# Patient Record
Sex: FEMALE | Race: WHITE | NOT HISPANIC OR LATINO
[De-identification: names, ages, dates, MRNs, and addresses within clinical notes are randomized per-mention and may not be internally consistent; named-entity substitution may affect disease eponyms.]

---

## 2017-10-30 ENCOUNTER — RESULT REVIEW (OUTPATIENT)
Age: 27
End: 2017-10-30

## 2018-06-04 ENCOUNTER — APPOINTMENT (OUTPATIENT)
Dept: HUMAN REPRODUCTION | Facility: CLINIC | Age: 28
End: 2018-06-04
Payer: COMMERCIAL

## 2018-06-04 PROCEDURE — 99205 OFFICE O/P NEW HI 60 MIN: CPT | Mod: 25

## 2018-06-04 PROCEDURE — 76830 TRANSVAGINAL US NON-OB: CPT

## 2018-06-04 PROCEDURE — 36415 COLL VENOUS BLD VENIPUNCTURE: CPT

## 2018-06-05 ENCOUNTER — TRANSCRIPTION ENCOUNTER (OUTPATIENT)
Age: 28
End: 2018-06-05

## 2018-06-13 ENCOUNTER — APPOINTMENT (OUTPATIENT)
Dept: HUMAN REPRODUCTION | Facility: CLINIC | Age: 28
End: 2018-06-13
Payer: COMMERCIAL

## 2018-06-13 PROCEDURE — 36415 COLL VENOUS BLD VENIPUNCTURE: CPT

## 2018-09-18 ENCOUNTER — RESULT REVIEW (OUTPATIENT)
Age: 28
End: 2018-09-18

## 2018-12-03 ENCOUNTER — ASOB RESULT (OUTPATIENT)
Age: 28
End: 2018-12-03

## 2018-12-03 ENCOUNTER — APPOINTMENT (OUTPATIENT)
Dept: ANTEPARTUM | Facility: CLINIC | Age: 28
End: 2018-12-03
Payer: COMMERCIAL

## 2018-12-03 PROCEDURE — 76817 TRANSVAGINAL US OBSTETRIC: CPT | Mod: 59

## 2018-12-03 PROCEDURE — 76811 OB US DETAILED SNGL FETUS: CPT

## 2019-04-28 ENCOUNTER — INPATIENT (INPATIENT)
Facility: HOSPITAL | Age: 29
LOS: 2 days | Discharge: HOME | End: 2019-05-01
Attending: OBSTETRICS & GYNECOLOGY | Admitting: OBSTETRICS & GYNECOLOGY
Payer: COMMERCIAL

## 2019-04-28 DIAGNOSIS — Z34.80 ENCOUNTER FOR SUPERVISION OF OTHER NORMAL PREGNANCY, UNSPECIFIED TRIMESTER: ICD-10-CM

## 2019-04-28 DIAGNOSIS — O26.899 OTHER SPECIFIED PREGNANCY RELATED CONDITIONS, UNSPECIFIED TRIMESTER: ICD-10-CM

## 2019-04-28 DIAGNOSIS — Z3A.00 WEEKS OF GESTATION OF PREGNANCY NOT SPECIFIED: ICD-10-CM

## 2019-04-28 LAB
BASOPHILS # BLD AUTO: 0.1 K/UL — SIGNIFICANT CHANGE UP (ref 0–0.2)
BASOPHILS NFR BLD AUTO: 0.6 % — SIGNIFICANT CHANGE UP (ref 0–2)
BLD GP AB SCN SERPL QL: NEGATIVE — SIGNIFICANT CHANGE UP
EOSINOPHIL # BLD AUTO: 0.2 K/UL — SIGNIFICANT CHANGE UP (ref 0–0.5)
EOSINOPHIL NFR BLD AUTO: 1.7 % — SIGNIFICANT CHANGE UP (ref 0–6)
HCT VFR BLD CALC: 35.7 % — SIGNIFICANT CHANGE UP (ref 34.5–45)
HGB BLD-MCNC: 12.6 G/DL — SIGNIFICANT CHANGE UP (ref 11.5–15.5)
LYMPHOCYTES # BLD AUTO: 1.9 K/UL — SIGNIFICANT CHANGE UP (ref 1–3.3)
LYMPHOCYTES # BLD AUTO: 18 % — SIGNIFICANT CHANGE UP (ref 13–44)
MCHC RBC-ENTMCNC: 33.3 PG — SIGNIFICANT CHANGE UP (ref 27–34)
MCHC RBC-ENTMCNC: 35.3 GM/DL — SIGNIFICANT CHANGE UP (ref 32–36)
MCV RBC AUTO: 94.5 FL — SIGNIFICANT CHANGE UP (ref 80–100)
MONOCYTES # BLD AUTO: 0.7 K/UL — SIGNIFICANT CHANGE UP (ref 0–0.9)
MONOCYTES NFR BLD AUTO: 6.5 % — SIGNIFICANT CHANGE UP (ref 2–14)
NEUTROPHILS # BLD AUTO: 7.9 K/UL — HIGH (ref 1.8–7.4)
NEUTROPHILS NFR BLD AUTO: 73.3 % — SIGNIFICANT CHANGE UP (ref 43–77)
PLATELET # BLD AUTO: 190 K/UL — SIGNIFICANT CHANGE UP (ref 150–400)
RBC # BLD: 3.78 M/UL — LOW (ref 3.8–5.2)
RBC # FLD: 12.1 % — SIGNIFICANT CHANGE UP (ref 10.3–14.5)
RH IG SCN BLD-IMP: POSITIVE — SIGNIFICANT CHANGE UP
RH IG SCN BLD-IMP: POSITIVE — SIGNIFICANT CHANGE UP
WBC # BLD: 10.8 K/UL — HIGH (ref 3.8–10.5)
WBC # FLD AUTO: 10.8 K/UL — HIGH (ref 3.8–10.5)

## 2019-04-28 RX ORDER — OXYTOCIN 10 UNIT/ML
4 VIAL (ML) INJECTION
Qty: 30 | Refills: 0 | Status: DISCONTINUED | OUTPATIENT
Start: 2019-04-28 | End: 2019-05-01

## 2019-04-28 RX ORDER — OXYTOCIN 10 UNIT/ML
333.33 VIAL (ML) INJECTION
Qty: 20 | Refills: 0 | Status: DISCONTINUED | OUTPATIENT
Start: 2019-04-28 | End: 2019-04-29

## 2019-04-28 RX ORDER — CITRIC ACID/SODIUM CITRATE 300-500 MG
15 SOLUTION, ORAL ORAL EVERY 4 HOURS
Qty: 0 | Refills: 0 | Status: DISCONTINUED | OUTPATIENT
Start: 2019-04-28 | End: 2019-04-29

## 2019-04-28 RX ORDER — SODIUM CHLORIDE 9 MG/ML
1000 INJECTION, SOLUTION INTRAVENOUS ONCE
Qty: 0 | Refills: 0 | Status: COMPLETED | OUTPATIENT
Start: 2019-04-28 | End: 2019-04-28

## 2019-04-28 RX ORDER — SODIUM CHLORIDE 9 MG/ML
1000 INJECTION, SOLUTION INTRAVENOUS
Qty: 0 | Refills: 0 | Status: DISCONTINUED | OUTPATIENT
Start: 2019-04-28 | End: 2019-04-29

## 2019-04-28 RX ADMIN — SODIUM CHLORIDE 2000 MILLILITER(S): 9 INJECTION, SOLUTION INTRAVENOUS at 19:45

## 2019-04-28 RX ADMIN — SODIUM CHLORIDE 250 MILLILITER(S): 9 INJECTION, SOLUTION INTRAVENOUS at 19:59

## 2019-04-29 VITALS
DIASTOLIC BLOOD PRESSURE: 55 MMHG | OXYGEN SATURATION: 98 % | SYSTOLIC BLOOD PRESSURE: 97 MMHG | RESPIRATION RATE: 18 BRPM | TEMPERATURE: 100 F | HEART RATE: 74 BPM

## 2019-04-29 LAB — T PALLIDUM AB TITR SER: NEGATIVE — SIGNIFICANT CHANGE UP

## 2019-04-29 RX ORDER — MAGNESIUM HYDROXIDE 400 MG/1
30 TABLET, CHEWABLE ORAL
Qty: 0 | Refills: 0 | Status: DISCONTINUED | OUTPATIENT
Start: 2019-04-30 | End: 2019-05-01

## 2019-04-29 RX ORDER — SODIUM CHLORIDE 9 MG/ML
3 INJECTION INTRAMUSCULAR; INTRAVENOUS; SUBCUTANEOUS EVERY 8 HOURS
Qty: 0 | Refills: 0 | Status: DISCONTINUED | OUTPATIENT
Start: 2019-04-29 | End: 2019-04-29

## 2019-04-29 RX ORDER — SODIUM CHLORIDE 9 MG/ML
500 INJECTION, SOLUTION INTRAVENOUS ONCE
Qty: 0 | Refills: 0 | Status: DISCONTINUED | OUTPATIENT
Start: 2019-04-29 | End: 2019-05-01

## 2019-04-29 RX ORDER — OXYCODONE HYDROCHLORIDE 5 MG/1
5 TABLET ORAL
Qty: 0 | Refills: 0 | Status: DISCONTINUED | OUTPATIENT
Start: 2019-04-30 | End: 2019-05-01

## 2019-04-29 RX ORDER — IBUPROFEN 200 MG
600 TABLET ORAL EVERY 6 HOURS
Qty: 0 | Refills: 0 | Status: COMPLETED | OUTPATIENT
Start: 2019-04-30 | End: 2020-03-28

## 2019-04-29 RX ORDER — GENTAMICIN SULFATE 40 MG/ML
375 VIAL (ML) INJECTION ONCE
Qty: 0 | Refills: 0 | Status: COMPLETED | OUTPATIENT
Start: 2019-04-29 | End: 2019-04-29

## 2019-04-29 RX ORDER — ACETAMINOPHEN 500 MG
975 TABLET ORAL EVERY 6 HOURS
Qty: 0 | Refills: 0 | Status: COMPLETED | OUTPATIENT
Start: 2019-04-30 | End: 2020-03-28

## 2019-04-29 RX ORDER — LANOLIN
1 OINTMENT (GRAM) TOPICAL EVERY 6 HOURS
Qty: 0 | Refills: 0 | Status: DISCONTINUED | OUTPATIENT
Start: 2019-04-30 | End: 2019-05-01

## 2019-04-29 RX ORDER — AER TRAVELER 0.5 G/1
1 SOLUTION RECTAL; TOPICAL EVERY 4 HOURS
Qty: 0 | Refills: 0 | Status: DISCONTINUED | OUTPATIENT
Start: 2019-04-29 | End: 2019-04-29

## 2019-04-29 RX ORDER — SODIUM CHLORIDE 9 MG/ML
3 INJECTION INTRAMUSCULAR; INTRAVENOUS; SUBCUTANEOUS EVERY 8 HOURS
Qty: 0 | Refills: 0 | Status: DISCONTINUED | OUTPATIENT
Start: 2019-04-30 | End: 2019-05-01

## 2019-04-29 RX ORDER — DIBUCAINE 1 %
1 OINTMENT (GRAM) RECTAL EVERY 4 HOURS
Qty: 0 | Refills: 0 | Status: DISCONTINUED | OUTPATIENT
Start: 2019-04-29 | End: 2019-04-29

## 2019-04-29 RX ORDER — HYDROCORTISONE 1 %
1 OINTMENT (GRAM) TOPICAL EVERY 4 HOURS
Qty: 0 | Refills: 0 | Status: DISCONTINUED | OUTPATIENT
Start: 2019-04-29 | End: 2019-04-29

## 2019-04-29 RX ORDER — KETOROLAC TROMETHAMINE 30 MG/ML
30 SYRINGE (ML) INJECTION ONCE
Qty: 0 | Refills: 0 | Status: DISCONTINUED | OUTPATIENT
Start: 2019-04-29 | End: 2019-04-29

## 2019-04-29 RX ORDER — PRAMOXINE HYDROCHLORIDE 150 MG/15G
1 AEROSOL, FOAM RECTAL EVERY 4 HOURS
Qty: 0 | Refills: 0 | Status: DISCONTINUED | OUTPATIENT
Start: 2019-04-30 | End: 2019-05-01

## 2019-04-29 RX ORDER — AMPICILLIN TRIHYDRATE 250 MG
2 CAPSULE ORAL EVERY 6 HOURS
Qty: 0 | Refills: 0 | Status: DISCONTINUED | OUTPATIENT
Start: 2019-04-30 | End: 2019-04-30

## 2019-04-29 RX ORDER — HYDROCORTISONE 1 %
1 OINTMENT (GRAM) TOPICAL EVERY 4 HOURS
Qty: 0 | Refills: 0 | Status: DISCONTINUED | OUTPATIENT
Start: 2019-04-30 | End: 2019-05-01

## 2019-04-29 RX ORDER — ACETAMINOPHEN 500 MG
975 TABLET ORAL ONCE
Qty: 0 | Refills: 0 | Status: COMPLETED | OUTPATIENT
Start: 2019-04-29 | End: 2019-04-29

## 2019-04-29 RX ORDER — DOCUSATE SODIUM 100 MG
100 CAPSULE ORAL
Qty: 0 | Refills: 0 | Status: DISCONTINUED | OUTPATIENT
Start: 2019-04-30 | End: 2019-05-01

## 2019-04-29 RX ORDER — GLYCERIN ADULT
1 SUPPOSITORY, RECTAL RECTAL AT BEDTIME
Qty: 0 | Refills: 0 | Status: DISCONTINUED | OUTPATIENT
Start: 2019-04-30 | End: 2019-05-01

## 2019-04-29 RX ORDER — OXYCODONE HYDROCHLORIDE 5 MG/1
5 TABLET ORAL EVERY 4 HOURS
Qty: 0 | Refills: 0 | Status: DISCONTINUED | OUTPATIENT
Start: 2019-04-30 | End: 2019-05-01

## 2019-04-29 RX ORDER — SIMETHICONE 80 MG/1
80 TABLET, CHEWABLE ORAL EVERY 6 HOURS
Qty: 0 | Refills: 0 | Status: DISCONTINUED | OUTPATIENT
Start: 2019-04-30 | End: 2019-05-01

## 2019-04-29 RX ORDER — AER TRAVELER 0.5 G/1
1 SOLUTION RECTAL; TOPICAL EVERY 4 HOURS
Qty: 0 | Refills: 0 | Status: DISCONTINUED | OUTPATIENT
Start: 2019-04-30 | End: 2019-05-01

## 2019-04-29 RX ORDER — AMPICILLIN TRIHYDRATE 250 MG
2 CAPSULE ORAL ONCE
Qty: 0 | Refills: 0 | Status: COMPLETED | OUTPATIENT
Start: 2019-04-29 | End: 2019-04-29

## 2019-04-29 RX ORDER — AMPICILLIN TRIHYDRATE 250 MG
CAPSULE ORAL
Qty: 0 | Refills: 0 | Status: DISCONTINUED | OUTPATIENT
Start: 2019-04-29 | End: 2019-04-30

## 2019-04-29 RX ORDER — PRAMOXINE HYDROCHLORIDE 150 MG/15G
1 AEROSOL, FOAM RECTAL EVERY 4 HOURS
Qty: 0 | Refills: 0 | Status: DISCONTINUED | OUTPATIENT
Start: 2019-04-29 | End: 2019-04-29

## 2019-04-29 RX ORDER — DIPHENHYDRAMINE HCL 50 MG
25 CAPSULE ORAL EVERY 6 HOURS
Qty: 0 | Refills: 0 | Status: DISCONTINUED | OUTPATIENT
Start: 2019-04-30 | End: 2019-05-01

## 2019-04-29 RX ORDER — TETANUS TOXOID, REDUCED DIPHTHERIA TOXOID AND ACELLULAR PERTUSSIS VACCINE, ADSORBED 5; 2.5; 8; 8; 2.5 [IU]/.5ML; [IU]/.5ML; UG/.5ML; UG/.5ML; UG/.5ML
0.5 SUSPENSION INTRAMUSCULAR ONCE
Qty: 0 | Refills: 0 | Status: DISCONTINUED | OUTPATIENT
Start: 2019-04-30 | End: 2019-05-01

## 2019-04-29 RX ORDER — OXYTOCIN 10 UNIT/ML
41.67 VIAL (ML) INJECTION
Qty: 20 | Refills: 0 | Status: DISCONTINUED | OUTPATIENT
Start: 2019-04-29 | End: 2019-04-29

## 2019-04-29 RX ORDER — DIBUCAINE 1 %
1 OINTMENT (GRAM) RECTAL EVERY 4 HOURS
Qty: 0 | Refills: 0 | Status: DISCONTINUED | OUTPATIENT
Start: 2019-04-30 | End: 2019-05-01

## 2019-04-29 RX ADMIN — Medication 300 MILLIGRAM(S): at 18:21

## 2019-04-29 RX ADMIN — Medication 975 MILLIGRAM(S): at 17:27

## 2019-04-29 RX ADMIN — Medication 30 MILLIGRAM(S): at 22:00

## 2019-04-29 RX ADMIN — Medication 4 MILLIUNIT(S)/MIN: at 03:12

## 2019-04-29 RX ADMIN — Medication 216 GRAM(S): at 17:29

## 2019-04-30 LAB
HCT VFR BLD CALC: 33.5 % — LOW (ref 34.5–45)
HGB BLD-MCNC: 11.8 G/DL — SIGNIFICANT CHANGE UP (ref 11.5–15.5)

## 2019-04-30 RX ORDER — IBUPROFEN 200 MG
600 TABLET ORAL EVERY 6 HOURS
Qty: 0 | Refills: 0 | Status: DISCONTINUED | OUTPATIENT
Start: 2019-04-30 | End: 2019-05-01

## 2019-04-30 RX ORDER — ACETAMINOPHEN 500 MG
975 TABLET ORAL EVERY 6 HOURS
Qty: 0 | Refills: 0 | Status: DISCONTINUED | OUTPATIENT
Start: 2019-04-30 | End: 2019-05-01

## 2019-04-30 RX ADMIN — Medication 975 MILLIGRAM(S): at 13:00

## 2019-04-30 RX ADMIN — Medication 600 MILLIGRAM(S): at 16:15

## 2019-04-30 RX ADMIN — Medication 600 MILLIGRAM(S): at 09:40

## 2019-04-30 RX ADMIN — Medication 600 MILLIGRAM(S): at 10:30

## 2019-04-30 RX ADMIN — Medication 100 MILLIGRAM(S): at 09:28

## 2019-04-30 RX ADMIN — Medication 975 MILLIGRAM(S): at 23:48

## 2019-04-30 RX ADMIN — Medication 600 MILLIGRAM(S): at 22:25

## 2019-04-30 RX ADMIN — SODIUM CHLORIDE 3 MILLILITER(S): 9 INJECTION INTRAMUSCULAR; INTRAVENOUS; SUBCUTANEOUS at 06:28

## 2019-04-30 RX ADMIN — Medication 975 MILLIGRAM(S): at 02:30

## 2019-04-30 RX ADMIN — Medication 975 MILLIGRAM(S): at 02:51

## 2019-04-30 RX ADMIN — Medication 975 MILLIGRAM(S): at 17:16

## 2019-04-30 RX ADMIN — Medication 975 MILLIGRAM(S): at 12:54

## 2019-04-30 RX ADMIN — Medication 100 MILLIGRAM(S): at 23:48

## 2019-04-30 RX ADMIN — Medication 600 MILLIGRAM(S): at 21:26

## 2019-04-30 RX ADMIN — Medication 600 MILLIGRAM(S): at 16:06

## 2019-04-30 NOTE — PROGRESS NOTE ADULT - PROBLEM SELECTOR PLAN 1
-Is s/p abx. Afebrile. 1x chills this am. Will continue to monitor.  - Pain well controlled, continue current pain regimen  - Increase ambulation  - Continue regular diet    Carla Guzmán PGY-1

## 2019-05-01 ENCOUNTER — TRANSCRIPTION ENCOUNTER (OUTPATIENT)
Age: 29
End: 2019-05-01

## 2019-05-01 VITALS
HEART RATE: 60 BPM | SYSTOLIC BLOOD PRESSURE: 114 MMHG | RESPIRATION RATE: 18 BRPM | TEMPERATURE: 98 F | DIASTOLIC BLOOD PRESSURE: 76 MMHG

## 2019-05-01 PROCEDURE — 86780 TREPONEMA PALLIDUM: CPT

## 2019-05-01 PROCEDURE — 86901 BLOOD TYPING SEROLOGIC RH(D): CPT

## 2019-05-01 PROCEDURE — 86900 BLOOD TYPING SEROLOGIC ABO: CPT

## 2019-05-01 PROCEDURE — 85014 HEMATOCRIT: CPT

## 2019-05-01 PROCEDURE — 59050 FETAL MONITOR W/REPORT: CPT

## 2019-05-01 PROCEDURE — 86850 RBC ANTIBODY SCREEN: CPT

## 2019-05-01 PROCEDURE — 85027 COMPLETE CBC AUTOMATED: CPT

## 2019-05-01 PROCEDURE — G0463: CPT

## 2019-05-01 PROCEDURE — 59025 FETAL NON-STRESS TEST: CPT

## 2019-05-01 PROCEDURE — 85018 HEMOGLOBIN: CPT

## 2019-05-01 RX ORDER — IBUPROFEN 200 MG
1 TABLET ORAL
Qty: 0 | Refills: 0 | DISCHARGE
Start: 2019-05-01

## 2019-05-01 RX ORDER — ACETAMINOPHEN 500 MG
2 TABLET ORAL
Qty: 0 | Refills: 0 | DISCHARGE
Start: 2019-05-01

## 2019-05-01 RX ADMIN — Medication 600 MILLIGRAM(S): at 10:10

## 2019-05-01 RX ADMIN — Medication 600 MILLIGRAM(S): at 03:44

## 2019-05-01 RX ADMIN — Medication 600 MILLIGRAM(S): at 04:45

## 2019-05-01 RX ADMIN — Medication 975 MILLIGRAM(S): at 05:19

## 2019-05-01 RX ADMIN — Medication 100 MILLIGRAM(S): at 10:10

## 2019-05-01 RX ADMIN — Medication 975 MILLIGRAM(S): at 00:50

## 2019-05-01 RX ADMIN — Medication 600 MILLIGRAM(S): at 11:00

## 2019-05-01 RX ADMIN — Medication 975 MILLIGRAM(S): at 06:20

## 2019-05-01 NOTE — PROGRESS NOTE ADULT - SUBJECTIVE AND OBJECTIVE BOX
OB Attending Note    S: Patient doing well. Minimal lochia. Pain controlled.    O: Vital Signs Last 24 Hrs  T(C): 36.5 (01 May 2019 09:37), Max: 36.5 (2019 13:52)  T(F): 97.7 (01 May 2019 09:37), Max: 97.7 (2019 13:52)  HR: 60 (01 May 2019 09:37) (60 - 88)  BP: 114/76 (01 May 2019 09:37) (103/69 - 114/76)      Gen: NAD  Abd: soft, NT, ND, fundus firm below umbilicus  Lochia: min  Perineum healing well  Ext: no tenderness    Labs:                        11.8   x     )-----------( x        ( 2019 06:48 )             33.5       A: 28y PPD#2  s/p  doing well.    Plan: cont PP care  OOB/ambulation  Pain control
OB Progress Note:  PPD#1    S: 29yo  PPD#1 s/p  c/b Intrapartum temperature. Patient feels well. Pain is well controlled. She is tolerating a regular diet and passing flatus. She is voiding spontaneously, and ambulating without difficulty. Denies CP/SOB. Denies lightheadedness/dizziness. Denies N/V. Denies fevers. Does report 1x episode of chills this am.    O:  Vitals:  Vital Signs Last 24 Hrs  T(C): 36.6 (2019 05:18), Max: 37.7 (2019 21:25)  T(F): 97.8 (2019 05:18), Max: 99.9 (2019 21:25)  HR: 68 (2019 05:18) (68 - 82)  BP: 94/60 (2019 05:18) (94/60 - 129/60)  BP(mean): 77 (2019 23:25) (73 - 87)  RR: 18 (2019 05:18) (18 - 18)  SpO2: 99% (2019 01:41) (96% - 99%)    MEDICATIONS  (STANDING):  acetaminophen   Tablet .. 975 milliGRAM(s) Oral every 6 hours  diphtheria/tetanus/pertussis (acellular) Vaccine (ADAcel) 0.5 milliLiter(s) IntraMuscular once  ibuprofen  Tablet. 600 milliGRAM(s) Oral every 6 hours  lactated ringers Bolus 500 milliLiter(s) IV Bolus once  oxyCODONE    IR 5 milliGRAM(s) Oral every 3 hours  oxytocin Infusion 4 milliUNIT(s)/Min (4 mL/Hr) IV Continuous <Continuous>  prenatal multivitamin 1 Tablet(s) Oral daily  sodium chloride 0.9% lock flush 3 milliLiter(s) IV Push every 8 hours      Physical Exam:  General: NAD  Abdomen: soft, non-tender, non-distended, fundus firm  Vaginal: Lochia wnl  Extremities: NTTP, no erythema, no edema    Labs:  Blood type: O Positive  Rubella IgG: RPR: Negative                          11.8   -- >-----------< --    (  @ 06:48 )             33.5<L>                        12.6   10.8<H> >-----------< 190    (  @ 20:33 )             35.7
OB Progress Note:  PPD#2    S: 27yo PPD#2 s/p  c/b intrapartum temperature. Patient feels well. Pain is well controlled. She is tolerating a regular diet and passing flatus. She is voiding spontaneously, and ambulating without difficulty. Denies CP/SOB. Denies lightheadedness/dizziness. Denies N/V.    O:  Vitals:   Vital Signs Last 24 Hrs  T(C): 36.5 (2019 17:39), Max: 36.5 (2019 13:52)  T(F): 97.7 (2019 17:39), Max: 97.7 (2019 13:52)  HR: 78 (2019 17:39) (78 - 88)  BP: 103/69 (2019 17:39) (103/69 - 104/69)  BP(mean): --  RR: 18 (2019 17:39) (18 - 18)  SpO2: 98% (2019 13:52) (97% - 98%)    MEDICATIONS  (STANDING):  acetaminophen   Tablet .. 975 milliGRAM(s) Oral every 6 hours  diphtheria/tetanus/pertussis (acellular) Vaccine (ADAcel) 0.5 milliLiter(s) IntraMuscular once  ibuprofen  Tablet. 600 milliGRAM(s) Oral every 6 hours  lactated ringers Bolus 500 milliLiter(s) IV Bolus once  oxyCODONE    IR 5 milliGRAM(s) Oral every 3 hours  oxytocin Infusion 4 milliUNIT(s)/Min (4 mL/Hr) IV Continuous <Continuous>  prenatal multivitamin 1 Tablet(s) Oral daily  sodium chloride 0.9% lock flush 3 milliLiter(s) IV Push every 8 hours    MEDICATIONS  (PRN):  dibucaine 1% Ointment 1 Application(s) Topical every 4 hours PRN Perineal Discomfort  diphenhydrAMINE 25 milliGRAM(s) Oral every 6 hours PRN Itching  docusate sodium 100 milliGRAM(s) Oral two times a day PRN Stool Softening  glycerin Suppository - Adult 1 Suppository(s) Rectal at bedtime PRN Constipation  hydrocortisone 1% Cream 1 Application(s) Topical every 4 hours PRN Moderate to Severe Perineal Pain  lanolin Ointment 1 Application(s) Topical every 6 hours PRN Sore Nipples  magnesium hydroxide Suspension 30 milliLiter(s) Oral two times a day PRN Constipation  oxyCODONE    IR 5 milliGRAM(s) Oral every 4 hours PRN Severe Pain (7 -10)  pramoxine 1%/zinc 5% Cream 1 Application(s) Topical every 4 hours PRN Moderate to Severe Perineal Pain  simethicone 80 milliGRAM(s) Chew every 6 hours PRN Gas  witch hazel Pads 1 Application(s) Topical every 4 hours PRN Perineal Discomfort      Physical Exam:  General: NAD  Abdomen: soft, non-tender, non-distended, fundus firm  Vaginal: Lochia wnl  Extremities: NTTP, no erythema, no edema    Labs:  Blood type: O Positive  Rubella IgG: RPR: Negative                          11.8   -- >-----------< --    (  @ 06:48 )             33.5<L>                        12.6   10.8<H> >-----------< 190    (  @ 20:33 )             35.7

## 2019-05-01 NOTE — DISCHARGE NOTE OB - MATERIALS PROVIDED
Breastfeeding Mother’s Support Group Information/Guide to Postpartum Care/Birth Certificate Instructions/Discharge Medication Information for Patients and Families Pocket Guide/Back To Sleep Handout/Shaken Baby Prevention Handout/  Immunization Record/Breastfeeding Log/Breastfeeding Guide and Packet/Vaccinations/[facility] Hearing Screen Program/[facility]  Screening Program

## 2019-05-01 NOTE — DISCHARGE NOTE OB - PATIENT PORTAL LINK FT
You can access the [facility] Patient Portal, offered by [facility], by registering with the following website: [email or web address]

## 2019-05-01 NOTE — DISCHARGE NOTE OB - CARE PLAN
Principal Discharge DX:	 (normal spontaneous vaginal delivery)  Goal:	pain control  Assessment and plan of treatment:	nothing per vagina

## 2019-05-01 NOTE — DISCHARGE NOTE OB - CARE PROVIDER_API CALL
Marni Lan)  Obstetrics and Gynecology  [address], [address]  [place] [address]  Phone: [phone]  Fax: [phone]  Follow Up Time:

## 2019-05-01 NOTE — PROGRESS NOTE ADULT - PROBLEM SELECTOR PLAN 1
- Patient remains afebrile and asymptomatic. Is s/p antibiotics. Will continue to monitor VS.   - Pain well controlled, continue current pain regimen  - Increase ambulation  - Continue regular diet  - Discharge planning     Carla Guzmán PGY-1

## 2019-06-10 ENCOUNTER — RESULT REVIEW (OUTPATIENT)
Age: 29
End: 2019-06-10

## 2020-03-16 ENCOUNTER — RESULT REVIEW (OUTPATIENT)
Age: 30
End: 2020-03-16

## 2020-10-26 ENCOUNTER — APPOINTMENT (OUTPATIENT)
Dept: ANTEPARTUM | Facility: CLINIC | Age: 30
End: 2020-10-26
Payer: COMMERCIAL

## 2020-10-26 ENCOUNTER — ASOB RESULT (OUTPATIENT)
Age: 30
End: 2020-10-26

## 2020-10-26 PROCEDURE — 99072 ADDL SUPL MATRL&STAF TM PHE: CPT

## 2020-10-26 PROCEDURE — 76811 OB US DETAILED SNGL FETUS: CPT

## 2020-10-26 PROCEDURE — 76817 TRANSVAGINAL US OBSTETRIC: CPT | Mod: 59

## 2021-02-24 ENCOUNTER — OUTPATIENT (OUTPATIENT)
Dept: OUTPATIENT SERVICES | Facility: HOSPITAL | Age: 31
LOS: 1 days | End: 2021-02-24
Payer: COMMERCIAL

## 2021-02-24 VITALS
RESPIRATION RATE: 18 BRPM | HEART RATE: 90 BPM | OXYGEN SATURATION: 96 % | WEIGHT: 167.99 LBS | TEMPERATURE: 98 F | SYSTOLIC BLOOD PRESSURE: 101 MMHG | HEIGHT: 62 IN | DIASTOLIC BLOOD PRESSURE: 67 MMHG

## 2021-02-24 DIAGNOSIS — E03.9 HYPOTHYROIDISM, UNSPECIFIED: ICD-10-CM

## 2021-02-24 DIAGNOSIS — K08.409 PARTIAL LOSS OF TEETH, UNSPECIFIED CAUSE, UNSPECIFIED CLASS: Chronic | ICD-10-CM

## 2021-02-24 LAB
BLD GP AB SCN SERPL QL: NEGATIVE — SIGNIFICANT CHANGE UP
HCT VFR BLD CALC: 37.4 % — SIGNIFICANT CHANGE UP (ref 34.5–45)
HGB BLD-MCNC: 12.2 G/DL — SIGNIFICANT CHANGE UP (ref 11.5–15.5)
MCHC RBC-ENTMCNC: 31.7 PG — SIGNIFICANT CHANGE UP (ref 27–34)
MCHC RBC-ENTMCNC: 32.6 GM/DL — SIGNIFICANT CHANGE UP (ref 32–36)
MCV RBC AUTO: 97.1 FL — SIGNIFICANT CHANGE UP (ref 80–100)
NRBC # BLD: 0 /100 WBCS — SIGNIFICANT CHANGE UP (ref 0–0)
PLATELET # BLD AUTO: 201 K/UL — SIGNIFICANT CHANGE UP (ref 150–400)
RBC # BLD: 3.85 M/UL — SIGNIFICANT CHANGE UP (ref 3.8–5.2)
RBC # FLD: 13.5 % — SIGNIFICANT CHANGE UP (ref 10.3–14.5)
RH IG SCN BLD-IMP: POSITIVE — SIGNIFICANT CHANGE UP
WBC # BLD: 11.98 K/UL — HIGH (ref 3.8–10.5)
WBC # FLD AUTO: 11.98 K/UL — HIGH (ref 3.8–10.5)

## 2021-02-24 PROCEDURE — 86850 RBC ANTIBODY SCREEN: CPT

## 2021-02-24 PROCEDURE — G0463: CPT

## 2021-02-24 PROCEDURE — 85027 COMPLETE CBC AUTOMATED: CPT

## 2021-02-24 PROCEDURE — 86901 BLOOD TYPING SEROLOGIC RH(D): CPT

## 2021-02-24 PROCEDURE — 86900 BLOOD TYPING SEROLOGIC ABO: CPT

## 2021-02-24 RX ORDER — OXYTOCIN 10 UNIT/ML
333.33 VIAL (ML) INJECTION
Qty: 20 | Refills: 0 | Status: DISCONTINUED | OUTPATIENT
Start: 2021-03-09 | End: 2021-03-11

## 2021-02-24 RX ORDER — METOCLOPRAMIDE HCL 10 MG
10 TABLET ORAL ONCE
Refills: 0 | Status: DISCONTINUED | OUTPATIENT
Start: 2021-03-09 | End: 2021-03-09

## 2021-02-24 RX ORDER — SODIUM CHLORIDE 9 MG/ML
1000 INJECTION, SOLUTION INTRAVENOUS
Refills: 0 | Status: DISCONTINUED | OUTPATIENT
Start: 2021-03-09 | End: 2021-03-09

## 2021-02-24 NOTE — OB PST NOTE - HISTORY OF PRESENT ILLNESS
This is a 29 y/o female  with EDC 3/14/21 This is a 29 y/o female  with EDC 3/14/21 with fetal macrosomia, she presents today  for primary  3/9/21  covid swab to be done 3/6 at [facility]  pt denies fever, cough, SOB or in contact with anyone with covid or travel outside [place] past 30 days

## 2021-03-05 ENCOUNTER — TRANSCRIPTION ENCOUNTER (OUTPATIENT)
Age: 31
End: 2021-03-05

## 2021-03-06 ENCOUNTER — APPOINTMENT (OUTPATIENT)
Dept: DISASTER EMERGENCY | Facility: CLINIC | Age: 31
End: 2021-03-06

## 2021-03-07 LAB — SARS-COV-2 N GENE NPH QL NAA+PROBE: NOT DETECTED

## 2021-03-08 ENCOUNTER — TRANSCRIPTION ENCOUNTER (OUTPATIENT)
Age: 31
End: 2021-03-08

## 2021-03-09 ENCOUNTER — INPATIENT (INPATIENT)
Facility: HOSPITAL | Age: 31
LOS: 1 days | Discharge: HOME | End: 2021-03-11
Attending: OBSTETRICS & GYNECOLOGY | Admitting: OBSTETRICS & GYNECOLOGY
Payer: COMMERCIAL

## 2021-03-09 VITALS
TEMPERATURE: 98 F | SYSTOLIC BLOOD PRESSURE: 122 MMHG | WEIGHT: 166.89 LBS | HEIGHT: 62 IN | DIASTOLIC BLOOD PRESSURE: 58 MMHG | RESPIRATION RATE: 18 BRPM | HEART RATE: 103 BPM

## 2021-03-09 DIAGNOSIS — K08.409 PARTIAL LOSS OF TEETH, UNSPECIFIED CAUSE, UNSPECIFIED CLASS: Chronic | ICD-10-CM

## 2021-03-09 LAB
BLD GP AB SCN SERPL QL: NEGATIVE — SIGNIFICANT CHANGE UP
RH IG SCN BLD-IMP: POSITIVE — SIGNIFICANT CHANGE UP
T PALLIDUM AB TITR SER: NEGATIVE — SIGNIFICANT CHANGE UP

## 2021-03-09 RX ORDER — CEFAZOLIN SODIUM 1 G
2000 VIAL (EA) INJECTION ONCE
Refills: 0 | Status: COMPLETED | OUTPATIENT
Start: 2021-03-09 | End: 2021-03-09

## 2021-03-09 RX ORDER — LANOLIN
1 OINTMENT (GRAM) TOPICAL EVERY 6 HOURS
Refills: 0 | Status: DISCONTINUED | OUTPATIENT
Start: 2021-03-09 | End: 2021-03-11

## 2021-03-09 RX ORDER — ACETAMINOPHEN 500 MG
975 TABLET ORAL
Refills: 0 | Status: DISCONTINUED | OUTPATIENT
Start: 2021-03-09 | End: 2021-03-11

## 2021-03-09 RX ORDER — OXYCODONE HYDROCHLORIDE 5 MG/1
5 TABLET ORAL
Refills: 0 | Status: DISCONTINUED | OUTPATIENT
Start: 2021-03-09 | End: 2021-03-10

## 2021-03-09 RX ORDER — NALOXONE HYDROCHLORIDE 4 MG/.1ML
0.1 SPRAY NASAL
Refills: 0 | Status: DISCONTINUED | OUTPATIENT
Start: 2021-03-09 | End: 2021-03-10

## 2021-03-09 RX ORDER — SIMETHICONE 80 MG/1
80 TABLET, CHEWABLE ORAL EVERY 4 HOURS
Refills: 0 | Status: DISCONTINUED | OUTPATIENT
Start: 2021-03-09 | End: 2021-03-11

## 2021-03-09 RX ORDER — TETANUS TOXOID, REDUCED DIPHTHERIA TOXOID AND ACELLULAR PERTUSSIS VACCINE, ADSORBED 5; 2.5; 8; 8; 2.5 [IU]/.5ML; [IU]/.5ML; UG/.5ML; UG/.5ML; UG/.5ML
0.5 SUSPENSION INTRAMUSCULAR ONCE
Refills: 0 | Status: DISCONTINUED | OUTPATIENT
Start: 2021-03-09 | End: 2021-03-11

## 2021-03-09 RX ORDER — OXYCODONE HYDROCHLORIDE 5 MG/1
5 TABLET ORAL
Refills: 0 | Status: DISCONTINUED | OUTPATIENT
Start: 2021-03-09 | End: 2021-03-11

## 2021-03-09 RX ORDER — DIPHENHYDRAMINE HCL 50 MG
25 CAPSULE ORAL EVERY 6 HOURS
Refills: 0 | Status: DISCONTINUED | OUTPATIENT
Start: 2021-03-09 | End: 2021-03-11

## 2021-03-09 RX ORDER — MAGNESIUM HYDROXIDE 400 MG/1
30 TABLET, CHEWABLE ORAL
Refills: 0 | Status: DISCONTINUED | OUTPATIENT
Start: 2021-03-09 | End: 2021-03-11

## 2021-03-09 RX ORDER — OXYCODONE HYDROCHLORIDE 5 MG/1
10 TABLET ORAL
Refills: 0 | Status: DISCONTINUED | OUTPATIENT
Start: 2021-03-09 | End: 2021-03-10

## 2021-03-09 RX ORDER — SODIUM CHLORIDE 9 MG/ML
1000 INJECTION, SOLUTION INTRAVENOUS ONCE
Refills: 0 | Status: COMPLETED | OUTPATIENT
Start: 2021-03-09 | End: 2021-03-09

## 2021-03-09 RX ORDER — FAMOTIDINE 10 MG/ML
20 INJECTION INTRAVENOUS ONCE
Refills: 0 | Status: COMPLETED | OUTPATIENT
Start: 2021-03-09 | End: 2021-03-09

## 2021-03-09 RX ORDER — KETOROLAC TROMETHAMINE 30 MG/ML
30 SYRINGE (ML) INJECTION EVERY 6 HOURS
Refills: 0 | Status: COMPLETED | OUTPATIENT
Start: 2021-03-09 | End: 2021-03-11

## 2021-03-09 RX ORDER — MORPHINE SULFATE 50 MG/1
0.1 CAPSULE, EXTENDED RELEASE ORAL ONCE
Refills: 0 | Status: DISCONTINUED | OUTPATIENT
Start: 2021-03-09 | End: 2021-03-10

## 2021-03-09 RX ORDER — SODIUM CHLORIDE 9 MG/ML
1000 INJECTION, SOLUTION INTRAVENOUS
Refills: 0 | Status: DISCONTINUED | OUTPATIENT
Start: 2021-03-09 | End: 2021-03-11

## 2021-03-09 RX ORDER — CITRIC ACID/SODIUM CITRATE 300-500 MG
15 SOLUTION, ORAL ORAL ONCE
Refills: 0 | Status: COMPLETED | OUTPATIENT
Start: 2021-03-09 | End: 2021-03-09

## 2021-03-09 RX ORDER — OXYTOCIN 10 UNIT/ML
333.33 VIAL (ML) INJECTION
Qty: 20 | Refills: 0 | Status: DISCONTINUED | OUTPATIENT
Start: 2021-03-09 | End: 2021-03-11

## 2021-03-09 RX ORDER — ONDANSETRON 8 MG/1
4 TABLET, FILM COATED ORAL EVERY 6 HOURS
Refills: 0 | Status: DISCONTINUED | OUTPATIENT
Start: 2021-03-09 | End: 2021-03-10

## 2021-03-09 RX ORDER — NALBUPHINE HYDROCHLORIDE 10 MG/ML
2.5 INJECTION, SOLUTION INTRAMUSCULAR; INTRAVENOUS; SUBCUTANEOUS EVERY 6 HOURS
Refills: 0 | Status: DISCONTINUED | OUTPATIENT
Start: 2021-03-09 | End: 2021-03-10

## 2021-03-09 RX ORDER — IBUPROFEN 200 MG
600 TABLET ORAL EVERY 6 HOURS
Refills: 0 | Status: COMPLETED | OUTPATIENT
Start: 2021-03-09 | End: 2022-02-05

## 2021-03-09 RX ORDER — OXYCODONE HYDROCHLORIDE 5 MG/1
5 TABLET ORAL ONCE
Refills: 0 | Status: DISCONTINUED | OUTPATIENT
Start: 2021-03-09 | End: 2021-03-11

## 2021-03-09 RX ORDER — LEVOTHYROXINE SODIUM 125 MCG
50 TABLET ORAL DAILY
Refills: 0 | Status: DISCONTINUED | OUTPATIENT
Start: 2021-03-10 | End: 2021-03-11

## 2021-03-09 RX ORDER — DEXAMETHASONE 0.5 MG/5ML
4 ELIXIR ORAL EVERY 6 HOURS
Refills: 0 | Status: DISCONTINUED | OUTPATIENT
Start: 2021-03-09 | End: 2021-03-10

## 2021-03-09 RX ORDER — HEPARIN SODIUM 5000 [USP'U]/ML
5000 INJECTION INTRAVENOUS; SUBCUTANEOUS EVERY 12 HOURS
Refills: 0 | Status: DISCONTINUED | OUTPATIENT
Start: 2021-03-09 | End: 2021-03-11

## 2021-03-09 RX ADMIN — Medication 15 MILLILITER(S): at 12:03

## 2021-03-09 RX ADMIN — Medication 1000 MILLIUNIT(S)/MIN: at 14:48

## 2021-03-09 RX ADMIN — Medication 100 MILLIGRAM(S): at 12:58

## 2021-03-09 RX ADMIN — FAMOTIDINE 20 MILLIGRAM(S): 10 INJECTION INTRAVENOUS at 12:03

## 2021-03-09 RX ADMIN — SODIUM CHLORIDE 2000 MILLILITER(S): 9 INJECTION, SOLUTION INTRAVENOUS at 12:04

## 2021-03-09 RX ADMIN — ONDANSETRON 4 MILLIGRAM(S): 8 TABLET, FILM COATED ORAL at 20:23

## 2021-03-09 RX ADMIN — Medication 975 MILLIGRAM(S): at 21:00

## 2021-03-09 RX ADMIN — Medication 30 MILLIGRAM(S): at 23:40

## 2021-03-09 RX ADMIN — HEPARIN SODIUM 5000 UNIT(S): 5000 INJECTION INTRAVENOUS; SUBCUTANEOUS at 20:23

## 2021-03-09 RX ADMIN — Medication 975 MILLIGRAM(S): at 20:23

## 2021-03-09 NOTE — OB PROVIDER H&P - ASSESSMENT
Assessment  No prenatal issues. GBS neg.    Plan  1. Admit to LND. Routine Labs. IVF.  2. Admit for scheduled c/s  3. Fetus: cat 1 tracing. VTX. Continuous EFM. Sono. No concerns.  4. Prenatal issues: none  5. GBS neg    Usha Rojas, PGY-1  Patient discussed with attending physician, Dr. Manzo

## 2021-03-09 NOTE — OB RN DELIVERY SUMMARY - NS_SEPSISRSKCALC_OBGYN_ALL_OB_FT
No temperature has been documented for this patient in CPN or on the OB Flowsheet. Ensure the highest temperature during labor was documented on the OB Flowsheet.  No gestational age at birth has been documented. Ensure delivery date/time has been entered above.  Rupture of membranes must be entered above.   EOS calculated successfully. EOS Risk Factor: 0.5/1000 live births ([facility] national incidence); GA=39w2d; Temp=97.9; ROM=0.017; GBS='Negative'; Antibiotics='No antibiotics or any antibiotics < 2 hrs prior to birth'

## 2021-03-09 NOTE — OB NEONATOLOGY/PEDIATRICIAN DELIVERY SUMMARY - NSPEDSNEONOTESA_OBGYN_ALL_OB_FT
Requested to attend CS delivery due to, (elective procedure). Mother is a  31yo  at  39.2weeks of gestation. Prenatal labs O+, negative/NR/immune. GBS negative from . Maternal PMHx: of hypothyroidism. Prenatal Care uncomplicated,  X1, missed X1. ROM at delivery, clear fluid. Delivery by primary CS, vertex presentation. Emerged vigorous with spontaneous cry.  Warmed, dried, stimulated and suctioned. APGAR 9/9.    Mother wants ot breast  feed, desires HepB vaccine.

## 2021-03-09 NOTE — OB RN PATIENT PROFILE - VISION (WITH CORRECTIVE LENSES IF THE PATIENT USUALLY WEARS THEM):
for facial swelling and mouth sores. Respiratory: Positive for choking. Negative for stridor. Cardiovascular: Positive for fatigue with feeds. Gastrointestinal: Negative. Musculoskeletal: Negative for extremity weakness. Skin: Negative for color change. Neurological: Negative. Negative for facial asymmetry. Hematological: Negative. All other systems reviewed and are negative. Objective:    Physical Exam  Vitals signs and nursing note reviewed. HENT:      Head: Normocephalic. Comments: Lingual Frenulum is  adhered to the posterior portion of the mandible restricting tongue movement anteriorly. Pt cannot place tongue past lips. Upper labial frenulum is  adhered to the anterior porion of the upper gingiva        Right Ear: Tympanic membrane and external ear normal.      Left Ear: Tympanic membrane and external ear normal.      Nose: Nose normal.      Mouth/Throat:      Mouth: Mucous membranes are moist.      Dentition: None present. Pharynx: Oropharynx is clear. Tonsils: 2+ on the right. 2+ on the left. Eyes:      General: Red reflex is present bilaterally. Pupils: Pupils are equal, round, and reactive to light. Neck:      Musculoskeletal: Normal range of motion and neck supple. Cardiovascular:      Rate and Rhythm: Regular rhythm. Pulmonary:      Effort: Pulmonary effort is normal.      Breath sounds: Normal breath sounds. Abdominal:      Palpations: Abdomen is soft. Skin:     General: Skin is warm. Neurological:      Mental Status: He is alert.            Hazlebaker score    Appearance items    Appearance of tongue when lifted:  1 slight cleft in tip apparent    Elasticity of frenulum:  1 moderately elastic    Length of lingual frenulum when tongue lifted:  1 1 cm  of the lingual frenulum to tongue:  1 at tip    Attachment oflingual frenulum to inferior alveolar ridge:  1 attached just below ridge      Function items    Lateralization:  1 body of
Normal vision: sees adequately in most situations; can see medication labels, newsprint

## 2021-03-09 NOTE — OB PROVIDER DELIVERY SUMMARY - NSPROVIDERDELIVERYNOTE_OBGYN_ALL_OB_FT
Delivery Note;    Patient was taken to the OR where a primary low flap transverse  was performed with ep-spinal.  viable male infant was delviered lot position, can x 1 loose.  cord blood and cord gases to lab.  placenta was manually removed and uterine exploration was wnl.   uterus was exteriorized and closed in two layers with running interlocking sutures of 1 vicryl.  hemostasis was obtained.  The rest of the procedure was uneventful.  All counts were correct after delivery.  Patient tolerated all procedures well.

## 2021-03-09 NOTE — OB PROVIDER H&P - HISTORY OF PRESENT ILLNESS
This is a 29 y/o female  with EDC 3/14/21 with fetal macrosomia, she presents today  for primary  3/9/21  covid swab to be done 3/6 at [facility]  pt denies fever, cough, SOB or in contact with anyone with covid or travel outside [place] past 30 days    Admission H&P    Subjective  HPI: 30yoF  @39/2  presents for scheduled c/s. +FM. -LOF. -CTXs. -VB. Pt denies any other concerns.    PNC: Denies prenatal issues. GBS neg.  EFW [id number] by leida.    OBHx: '  c/b shoulder dystocia  GynHx: denies hx STIs, fibroids, polyps, cysts  PMH: gestational hypothyroidism, denies hx clotting or bleeding disorders, HTN, DM  PSH: wisdom tooth extraction  PFH: no hx congential disorders, bleeding/clotting disorders  Psych: denies   Social: denies etoh, smoking, drugs. Safe at home/in relationship. Contraception.  Meds: PNV, levothyroxine 50mg  Allergies: NKDA

## 2021-03-09 NOTE — OB RN INTRAOPERATIVE NOTE - NS_URGENCYSURGERY_OBGYN_ALL_OB
SUBJECTIVE:  Emilia is a 68 year old female here for concerns about allergies and URI induced cough ( maybe wheezing).  This will occur one or twice a year.  Had bronchitis twice for up 6 weeks at a time.  However, symptoms resolved with no treatment and has no documented wheezing.  Also bothered by nasal congestion sneezing rhinorrhea and postnasal drip. This is a problem during the spring and summer season but can occur other times of the year as well. Lots of postnasal drip as result of this and call symptoms going back decades. She has oral allergy syndrome with carrots and on occasion apples. When raw these foods will cause oral pruritus and oftentimes carrots cause esophageal obstruction with food impaction. On a few occasions she's had vomited back up to prevent this from occurring. She notes she probably has reflux and has for 3 months been taking omeprazole every day. She's pretty sure that she is allergic to cat and is concerned about other animals. Had allergy testing perhaps 30 years ago but doesn't recall the exact results. Complains of itchy watery eyes, nasal congestion. Cannot recall food impaction at other times. Has not lost weight because of this and does not recall, this issue in the past.    Recalls rare wheezing or coughing when ill but no documented episodes of wheezing have been previously detected. She's concerned because her mom had emphysema secondary to tobacco use but patient never smoked. No ER hospital visits for bronchitis or wheezing.    Had seasonal allergies when younger.  Has cat allergies for years.       Current Outpatient Prescriptions   Medication   • levothyroxine (SYNTHROID, LEVOTHROID) 50 MCG tablet   • omeprazole (PRILOSEC) 40 MG capsule   • L-Methylfolate-Algae (DEPLIN 15) 15-90.314 MG capsule   • venlafaxine XR (EFFEXOR XR) 150 MG 24 hr capsule   • aspirin 81 MG tablet   • B Complex Vitamins (VITAMIN B COMPLEX PO)   • PROBIOTIC PRODUCT PO   • Coenzyme Q10 (Q-10 CO-ENZYME  PO)   • Omega-3 Fatty Acids (OMEGA 3 PO)   • zoledronic acid (RECLAST) 5 MG/100ML Solution injection   • cholecalciferol (VITAMIN D3) 1000 UNITS tablet   • fluocinolone acetonide (DERMOTIC OIL) 0.01 % otic oil   • traMADOL (ULTRAM) 50 MG tablet   • albuterol (PROAIR HFA) 108 (90 BASE) MCG/ACT inhaler   • ibuprofen (MOTRIN) 200 MG tablet     No current facility-administered medications for this visit.        ALLERGIES:   is allergic to hydrocodone; oxycodone; seasonal; and sulfa antibiotics.    No history of Bee Sting/Venom Allergy   Notes history of Food Allergy      PAST SURGICAL HISTORY  Past Surgical History:   Procedure Laterality Date   • Colonoscopy  4/20/15    Dr. Kaur; F/U 5 years   • Ep ablation  11/4/15    AFib, Flutter, Atrial Tach   • Laparoscopy,tubal cautery      Tubal Ligation   • Open access colonoscopy  1/5/05    normal no polyps.    • Open rx dist rad/ulna fx  01/28/2004    R wrist   • Removal gallbladder      Cholecystectomy (gallbladder)   • Tonsillectomy and adenoidectomy         PAST MEDICAL HISTORY  Past Medical History:   Diagnosis Date   • Atrial fibrillation (CMS/HCC)    • Attention deficit disorder with hyperactivity(314.01)    • Depression    • Effusion of lower leg joint 01/22/2004    R knee   • Gastroesophageal reflux disease    • Generalized anxiety disorder 12/30/2010   • MGD (meibomian gland dysfunction) 2/21/2012   • Multinodular goiter 7/9/2015   • Osteopenia    • Osteoporosis 9/15/2014   • Other closed fractures of distal end of radius (alone) 03/15/2004    R wrist--   • Pain in joint, lower leg 01/22/2004    R knee   • Primary localized osteoarthrosis, lower leg 01/22/2004    R knee   • Psoriasis 6/24/2010   • Stress fracture of other bone 5/2009    R and Left ankle Pins/plates    • Tear film insufficiency, unspecified 2/21/2012   • Thyroid nodule     left side FNA negative 1/2014   • Unspecified hypothyroidism 11/23/2009       FAMILY HISTORY  Family History   Problem Relation  Age of Onset   • Respiratory Mother         emphysema   • Thyroid Mother         Had Goiter or nodule removed (Pt. is unsure, will find out)   • Heart Father 84   • Cancer Sister 71        breast   • Asthma Son    • Asthma Daughter        SOCIAL HISTORY:  Second hand smoke is not present.  She is a retired .   She does not smoke.  She does  drink alcohol.      ENVIRONMENTAL HISTORY:  She lives in a 90+ year old home.  Pets:  She has no pets.  The home has central HVAC.  The bedroom has hardwood floors.  The basement is dry.  There is no mold apparent in the home.  Cockroaches are not present in the home.      REVIEW OF SYSTEMS:  Notes fatigue.  No weight loss or vertigo.  No headache, blurry or double vision.  No problems with taste or smell.  No bleeding of mouth or gums. No earache.  No pharyngitis.  No chest pain or palpitations.  No wheezing, coughing or shortness of breath. No abdominal pain, cramping or diarrhea. Notes reflux. No hematuria or dysuria. Notes arthritis in the legs. No swelling in the legs. No skin lesions or rashes. No hives or urticaria. No complaints of anxiety or nervousness.     PHYSICAL EXAMINATION:   GENERAL: She is in no acute distress.   VITAL SIGNS:   Visit Vitals  /78   Pulse 78   Resp 16   Ht 5' 5\" (1.651 m)   Wt 80.7 kg   SpO2 98%   BMI 29.62 kg/m²      HEENT exam is clear. There are no allergic shiners or sinus tenderness. Conjunctivae, pupils, and lids are normal. External and internal ear exam is normal. Nasal mucosa is pale. There is no turbinate hypertrophy, no polyps, no bleeding, no ulcer, no swelling, no discharge.  Oropharynx is clear. There is no thrush. Lips, gums and teeth are normal.   NECK: Supple and flexible with no cervical lymphadenopathy. Trachea is midline. There is no stridor.    CHEST: There are no supra or sub-clavicular lymph nodes palpable.  There are no retractions or visible respiratory distress.  No wheezes, rales or rhonchi.  There is good air movement throughout.    CARDIAC: Regular rate and rhythm. Normal S1, S2.  No murmur, gallop or rub. There is  no peripheral edema.   ABDOMEN: Normal bowel sounds in all four quadrants.  Soft, with no epigastric tenderness or rebound. No hepatomegaly or splenomegaly.     SKIN: Warm and dry.  There is no eczema, urticaria or dermatographism.   NEURO: The patient is alert and oriented x 3 with a normal affect.  The patient gives a clear medical history    DATA:    Allergy Skin Test    Prick Applied by:  Josselin Watters LPN 8/21/2018 10:35 AM   Prick Read by:  Logan Chopra MD 8/21/2018 10:36 AM         Site (prick):  Back    :  Chen    Is the patiet on beta-blockers?:  No Is the patient pregnant?:  N/A   Is the patient on antihistamines?:  No Is Asthma stable?:  N/A      Allergy SkinTesting - Prick     ALLERGEN ROUTE REACTION WHEAL FLARE COMMENT    Maple Prick 4+ 10 20     Elm Prick 4+ 10 20     B[place] Branch Prick Negative       Poplar Prick Negative       Sanborn Prick 2+ 5 10     Birch  Prick 4+ 10 20     [place] Prick 2+ 5 10     Maged Prick 2+ 5 10     Juniper Prick Negative       Proctor Prick Negative       ALLERGEN ROUTE REACTION WHEAL FLARE COMMENT    Ragweed Mix Prick Negative       Cocklebur Prick Negative       English Plantain Prick Negative       Mugwort Prick Negative       Russian Thistle Prick Negative       Lamb's quarter Prick Negative       Dock / Sorrel Prick Negative       Marshelder Prick Negative       Pigweed Prick Negative       Kochia Prick Negative       ALLERGEN ROUTE REACTION WHEAL FLARE COMMENT    Grass Prick 2+ 6 12     HDM Farinae Prick 3+ 8 15     HDM Pteronyssinus Prick Negative       AP Dog Prick 3+ 8 15     Cat Prick 4+ 10 20     Mouse Prick Negative       Cockroach Prick Negative       ALLERGEN ROUTE REACTION WHEAL FLARE COMMENT    Aspergillus Prick Negative       Alternaria Prick Negative       Cladosporium Prick Negative       ALLERGEN ROUTE REACTION  WHEAL FLARE COMMENT    Control Prick Negative       Histamine Prick 4+ 10 15       Allergy Skin Test - Food     ALLERGEN ROUTE REACTION WHEAL FLARE COMMENT    Corn Prick Negative       Cow's Milk Prick Negative       Egg White Prick Negative       Fish Mix Prick Negative       Peanut Prick Negative       Shellfish Mix Prick Negative       Soybean Prick Negative       Wheat Prick Negative       ALLERGEN ROUTE REACTION WHEAL FLARE COMMENT    Helotes Prick Negative       Brazil Nut Prick Negative       Cashew Prick Negative       Hazelnut/Filbert Prick Negative       Pecan Prick Negative       Pistachio Prick Negative       Quecreek Prick Negative         45 prick skin test completed. Strong reactions to multiple trees, weeds, grasses, dust mite dog and cat. Positive histamine control. No food allergy detected. Oral allergy syndrome is present.    Spirometry normal, FEV1 104 % predicted, FVC 97 % predicted. The flow volume loop is normal.    ASSESSMENT AND PLAN:  Based on today's evaluation, I made the following assessment and recommendations:    1. Patient has seasonal and perennial allergic rhinitis. Severe symptoms during the spring and associated oral allergy syndrome is present. Primary issue with carrots and apples when eating raw. Other fruits and vegetables are not an issue.      Oral allergy syndrome with pitted fresh fruits and vegetables is due to cross reactions between pollen antigens and heat labile proteins found in the foods.  These can cause localized oral pruritus and is typically not life threatening.  Avoiding the fruits or cooking / baking them can prevent the symptoms.    Recommend treating allergic rhinitis with avoidance measures along with fluticasone nasal spray and fexofenadine. If this fails control symptoms, could consider adding montelukast or using a nasal antihistamine. Immunotherapy is an option as well.    2. Patient reports long-standing reflux-like symptoms and food impaction. Because  of the impaction episodes I'm concerned she could have eosinophilic esophagitis. She's been taking omeprazole for 3 months and I encouraged her to continue this. I'm referring her to GI for endoscopy and biopsy looking for evidence of eosinophilic esophagitis. No food allergies could be identified on skin testing today.    3. There is no evidence of obstructive lung disease. Spirometry is normal. Cough probably related to the other issues above the recommended chest x-ray. Patient has never been identified as having any wheezing on physical exam. Current symptoms are secondary to allergic rhinitis and postnasal drip.    Thank you for allowing me to share in her  care.     Logan Chopra MD         Scheduled

## 2021-03-09 NOTE — OB PROVIDER H&P - NSHPPHYSICALEXAM_GEN_ALL_CORE
CV: RRR  Pulm: breathing comfortably on RA  Abd: gravid, nontender  Extr: moving all extremities with ease  – Sono: vertex

## 2021-03-10 LAB
BASOPHILS # BLD AUTO: 0.07 K/UL — SIGNIFICANT CHANGE UP (ref 0–0.2)
BASOPHILS NFR BLD AUTO: 0.5 % — SIGNIFICANT CHANGE UP (ref 0–2)
EOSINOPHIL # BLD AUTO: 0.16 K/UL — SIGNIFICANT CHANGE UP (ref 0–0.5)
EOSINOPHIL NFR BLD AUTO: 1.1 % — SIGNIFICANT CHANGE UP (ref 0–6)
HCT VFR BLD CALC: 34.1 % — LOW (ref 34.5–45)
HGB BLD-MCNC: 11.4 G/DL — LOW (ref 11.5–15.5)
IMM GRANULOCYTES NFR BLD AUTO: 1.2 % — SIGNIFICANT CHANGE UP (ref 0–1.5)
LYMPHOCYTES # BLD AUTO: 17.9 % — SIGNIFICANT CHANGE UP (ref 13–44)
LYMPHOCYTES # BLD AUTO: 2.62 K/UL — SIGNIFICANT CHANGE UP (ref 1–3.3)
MCHC RBC-ENTMCNC: 31.8 PG — SIGNIFICANT CHANGE UP (ref 27–34)
MCHC RBC-ENTMCNC: 33.4 GM/DL — SIGNIFICANT CHANGE UP (ref 32–36)
MCV RBC AUTO: 95.3 FL — SIGNIFICANT CHANGE UP (ref 80–100)
MONOCYTES # BLD AUTO: 1 K/UL — HIGH (ref 0–0.9)
MONOCYTES NFR BLD AUTO: 6.8 % — SIGNIFICANT CHANGE UP (ref 2–14)
NEUTROPHILS # BLD AUTO: 10.59 K/UL — HIGH (ref 1.8–7.4)
NEUTROPHILS NFR BLD AUTO: 72.5 % — SIGNIFICANT CHANGE UP (ref 43–77)
NRBC # BLD: 0 /100 WBCS — SIGNIFICANT CHANGE UP (ref 0–0)
PLATELET # BLD AUTO: 217 K/UL — SIGNIFICANT CHANGE UP (ref 150–400)
RBC # BLD: 3.58 M/UL — LOW (ref 3.8–5.2)
RBC # FLD: 13.5 % — SIGNIFICANT CHANGE UP (ref 10.3–14.5)
WBC # BLD: 14.61 K/UL — HIGH (ref 3.8–10.5)
WBC # FLD AUTO: 14.61 K/UL — HIGH (ref 3.8–10.5)

## 2021-03-10 RX ORDER — POLYETHYLENE GLYCOL 3350 17 G/17G
17 POWDER, FOR SOLUTION ORAL ONCE
Refills: 0 | Status: COMPLETED | OUTPATIENT
Start: 2021-03-10 | End: 2021-03-10

## 2021-03-10 RX ORDER — IBUPROFEN 200 MG
600 TABLET ORAL EVERY 6 HOURS
Refills: 0 | Status: DISCONTINUED | OUTPATIENT
Start: 2021-03-10 | End: 2021-03-11

## 2021-03-10 RX ADMIN — Medication 30 MILLIGRAM(S): at 06:51

## 2021-03-10 RX ADMIN — Medication 30 MILLIGRAM(S): at 12:40

## 2021-03-10 RX ADMIN — Medication 30 MILLIGRAM(S): at 06:21

## 2021-03-10 RX ADMIN — Medication 975 MILLIGRAM(S): at 04:00

## 2021-03-10 RX ADMIN — Medication 975 MILLIGRAM(S): at 14:59

## 2021-03-10 RX ADMIN — Medication 975 MILLIGRAM(S): at 09:19

## 2021-03-10 RX ADMIN — Medication 50 MICROGRAM(S): at 06:20

## 2021-03-10 RX ADMIN — POLYETHYLENE GLYCOL 3350 17 GRAM(S): 17 POWDER, FOR SOLUTION ORAL at 09:24

## 2021-03-10 RX ADMIN — Medication 975 MILLIGRAM(S): at 03:29

## 2021-03-10 RX ADMIN — Medication 975 MILLIGRAM(S): at 15:50

## 2021-03-10 RX ADMIN — Medication 975 MILLIGRAM(S): at 21:30

## 2021-03-10 RX ADMIN — HEPARIN SODIUM 5000 UNIT(S): 5000 INJECTION INTRAVENOUS; SUBCUTANEOUS at 20:45

## 2021-03-10 RX ADMIN — Medication 975 MILLIGRAM(S): at 20:49

## 2021-03-10 RX ADMIN — Medication 600 MILLIGRAM(S): at 18:00

## 2021-03-10 RX ADMIN — Medication 975 MILLIGRAM(S): at 10:20

## 2021-03-10 RX ADMIN — Medication 600 MILLIGRAM(S): at 19:00

## 2021-03-10 RX ADMIN — Medication 30 MILLIGRAM(S): at 11:41

## 2021-03-10 RX ADMIN — Medication 30 MILLIGRAM(S): at 00:10

## 2021-03-10 RX ADMIN — Medication 600 MILLIGRAM(S): at 23:50

## 2021-03-10 RX ADMIN — HEPARIN SODIUM 5000 UNIT(S): 5000 INJECTION INTRAVENOUS; SUBCUTANEOUS at 09:20

## 2021-03-10 NOTE — PROGRESS NOTE ADULT - ATTENDING COMMENTS
POD1 s/p 1'  section, doing well  -Routine postoperative care   -H/H appropriate   -Reg diet   -OMAMADOU Leon DO

## 2021-03-11 ENCOUNTER — TRANSCRIPTION ENCOUNTER (OUTPATIENT)
Age: 31
End: 2021-03-11

## 2021-03-11 VITALS
RESPIRATION RATE: 18 BRPM | OXYGEN SATURATION: 98 % | DIASTOLIC BLOOD PRESSURE: 56 MMHG | HEART RATE: 67 BPM | TEMPERATURE: 98 F | SYSTOLIC BLOOD PRESSURE: 95 MMHG

## 2021-03-11 PROCEDURE — 86900 BLOOD TYPING SEROLOGIC ABO: CPT

## 2021-03-11 PROCEDURE — 86780 TREPONEMA PALLIDUM: CPT

## 2021-03-11 PROCEDURE — 86850 RBC ANTIBODY SCREEN: CPT

## 2021-03-11 PROCEDURE — 59025 FETAL NON-STRESS TEST: CPT

## 2021-03-11 PROCEDURE — 86901 BLOOD TYPING SEROLOGIC RH(D): CPT

## 2021-03-11 PROCEDURE — 85025 COMPLETE CBC W/AUTO DIFF WBC: CPT

## 2021-03-11 PROCEDURE — 59050 FETAL MONITOR W/REPORT: CPT

## 2021-03-11 RX ORDER — LEVOTHYROXINE SODIUM 125 MCG
1 TABLET ORAL
Qty: 0 | Refills: 0 | DISCHARGE

## 2021-03-11 RX ORDER — IBUPROFEN 200 MG
1 TABLET ORAL
Qty: 0 | Refills: 0 | DISCHARGE
Start: 2021-03-11

## 2021-03-11 RX ORDER — ACETAMINOPHEN 500 MG
3 TABLET ORAL
Qty: 0 | Refills: 0 | DISCHARGE
Start: 2021-03-11

## 2021-03-11 RX ADMIN — Medication 600 MILLIGRAM(S): at 00:30

## 2021-03-11 RX ADMIN — Medication 600 MILLIGRAM(S): at 06:45

## 2021-03-11 RX ADMIN — Medication 975 MILLIGRAM(S): at 03:00

## 2021-03-11 RX ADMIN — Medication 50 MICROGRAM(S): at 06:46

## 2021-03-11 RX ADMIN — HEPARIN SODIUM 5000 UNIT(S): 5000 INJECTION INTRAVENOUS; SUBCUTANEOUS at 09:17

## 2021-03-11 RX ADMIN — Medication 975 MILLIGRAM(S): at 02:15

## 2021-03-11 RX ADMIN — SIMETHICONE 80 MILLIGRAM(S): 80 TABLET, CHEWABLE ORAL at 00:00

## 2021-03-11 RX ADMIN — Medication 975 MILLIGRAM(S): at 08:31

## 2021-03-11 RX ADMIN — Medication 975 MILLIGRAM(S): at 09:30

## 2021-03-11 NOTE — DISCHARGE NOTE OB - CARE PLAN
Principal Discharge DX:	 delivery delivered  Goal:	D/C HOME  Assessment and plan of treatment:	POSTOP 2 WEEKS  REPEAT THYROID TESTS 6 WEEKS

## 2021-03-11 NOTE — DISCHARGE NOTE OB - MEDICATION SUMMARY - MEDICATIONS TO TAKE
I will START or STAY ON the medications listed below when I get home from the hospital:    acetaminophen 325 mg oral tablet  -- 3 tab(s) by mouth   -- Indication: For  delivery delivered    ibuprofen 600 mg oral tablet  -- 1 tab(s) by mouth every 6 hours  -- Indication: For  delivery delivered    Prenatal Multivitamins with Folic Acid 1 mg oral tablet  -- 1 tab(s) by mouth once a day  -- Indication: For  delivery delivered

## 2021-03-11 NOTE — DISCHARGE NOTE OB - CARE PROVIDER_API CALL
Javon Li  OBSTETRICS AND GYNECOLOGY  [address], [address]  [place] [id number]  Phone: [phone]  Fax: [phone]  Follow Up Time:

## 2021-03-11 NOTE — DISCHARGE NOTE OB - PATIENT PORTAL LINK FT
You can access the FollowMyHealth Patient Portal offered by [facility] by registering at the following website: [email or web address]. By joining [facility]’s FollowMyHealth portal, you will also be able to view your health information using other applications (apps) compatible with our system.

## 2021-03-11 NOTE — PROGRESS NOTE ADULT - SUBJECTIVE AND OBJECTIVE BOX
Day 1 of Anesthesia Pain Management Service    SUBJECTIVE: Doing ok  Pain Scale Score:          [X] Refer to charted pain scores    THERAPY:    s/p   100  mcg PF morphine on 3\9\2021      MEDICATIONS  (STANDING):  acetaminophen   Tablet .. 975 milliGRAM(s) Oral <User Schedule>  diphtheria/tetanus/pertussis (acellular) Vaccine (ADAcel) 0.5 milliLiter(s) IntraMuscular once  heparin   Injectable 5000 Unit(s) SubCutaneous every 12 hours  ibuprofen  Tablet. 600 milliGRAM(s) Oral every 6 hours  ketorolac   Injectable 30 milliGRAM(s) IV Push every 6 hours  lactated ringers. 1000 milliLiter(s) (125 mL/Hr) IV Continuous <Continuous>  levothyroxine 50 MICROGram(s) Oral daily  morphine PF Spinal 0.1 milliGRAM(s) IntraThecal. once  oxytocin Infusion 333.333 milliUNIT(s)/Min (1000 mL/Hr) IV Continuous <Continuous>  oxytocin Infusion 333.333 milliUNIT(s)/Min (1000 mL/Hr) IV Continuous <Continuous>  polyethylene glycol 3350 17 Gram(s) Oral once    MEDICATIONS  (PRN):  dexAMETHasone  Injectable 4 milliGRAM(s) IV Push every 6 hours PRN Nausea  diphenhydrAMINE 25 milliGRAM(s) Oral every 6 hours PRN Pruritus  lanolin Ointment 1 Application(s) Topical every 6 hours PRN Sore Nipples  magnesium hydroxide Suspension 30 milliLiter(s) Oral two times a day PRN Constipation  nalbuphine Injectable 2.5 milliGRAM(s) IV Push every 6 hours PRN Pruritus  naloxone Injectable 0.1 milliGRAM(s) IV Push every 3 minutes PRN For ANY of the following changes in patient status:  A. Breaths Per Minute LESS THAN 10, B. Oxygen saturation LESS THAN 90%, C. Sedation score of 6 for Stop After: 4 Times  ondansetron Injectable 4 milliGRAM(s) IV Push every 6 hours PRN Nausea  oxyCODONE    IR 5 milliGRAM(s) Oral every 3 hours PRN Mild Pain (1 - 3)  oxyCODONE    IR 10 milliGRAM(s) Oral every 3 hours PRN Moderate Pain (4 - 6)  oxyCODONE    IR 5 milliGRAM(s) Oral every 3 hours PRN Moderate to Severe Pain (4-10)  oxyCODONE    IR 5 milliGRAM(s) Oral once PRN Moderate to Severe Pain (4-10)  simethicone 80 milliGRAM(s) Chew every 4 hours PRN Gas      OBJECTIVE:    Sedation:        	[X] Alert	 [ ] Drowsy	[ ] Arousable      [ ] Asleep       [ ] Unresponsive    Side Effects:	[X] None 	[ ] Nausea	[ ] Vomiting         [ ] Pruritus  		[ ] Weakness            [ ] Numbness	          [ ] Other:    Vital Signs Last 24 Hrs  T(C): 36.5 (10 Mar 2021 06:00), Max: 37.4 (09 Mar 2021 16:20)  T(F): 97.7 (10 Mar 2021 06:00), Max: 99.3 (09 Mar 2021 16:20)  HR: 64 (10 Mar 2021 06:00) (60 - 103)  BP: 125/68 (10 Mar 2021 06:00) (94/59 - 129/69)  BP(mean): 84 (09 Mar 2021 15:55) (71 - 90)  RR: 18 (10 Mar 2021 06:00) (14 - 44)  SpO2: 97% (10 Mar 2021 06:00) (97% - 100%)    ASSESSMENT/ PLAN  [X] Patient to be transitioned to prn analgesics later today  [X] Pain management per primary service, pain service to sign off   [X]Documentation and Verification of current medications
SUJATHA HANNON  MRN-[id number]    Subjective:DOING WELL. FOR D/C TODAY  Vital Signs Last 24 Hrs  T(C): 36.4 (11 Mar 2021 05:57), Max: 36.7 (10 Mar 2021 12:34)  T(F): 97.6 (11 Mar 2021 05:57), Max: 98 (10 Mar 2021 12:34)  HR: 67 (11 Mar 2021 05:57) (67 - 93)  BP: 95/56 (11 Mar 2021 05:57) (95/56 - 103/68)  BP(mean): --  RR: 18 (11 Mar 2021 05:57) (18 - 18)  SpO2: 98% (11 Mar 2021 05:57) (96% - 98%)    PHYSICAL EXAM:      Constitutional:  W/D W/F NAD      Abdomen: Firm fundus  Pelvic: Lochia mild                                        REVIEW OF SYSTEMS      General:	    Skin/Breast:  		  Gastrointestinal:	    Genitourinary:	      MEDICATIONS  (STANDING):  acetaminophen   Tablet .. 975 milliGRAM(s) Oral <User Schedule>  diphtheria/tetanus/pertussis (acellular) Vaccine (ADAcel) 0.5 milliLiter(s) IntraMuscular once  heparin   Injectable 5000 Unit(s) SubCutaneous every 12 hours  ibuprofen  Tablet. 600 milliGRAM(s) Oral every 6 hours  ketorolac   Injectable 30 milliGRAM(s) IV Push every 6 hours  lactated ringers. 1000 milliLiter(s) (125 mL/Hr) IV Continuous <Continuous>  levothyroxine 50 MICROGram(s) Oral daily  oxytocin Infusion 333.333 milliUNIT(s)/Min (1000 mL/Hr) IV Continuous <Continuous>  oxytocin Infusion 333.333 milliUNIT(s)/Min (1000 mL/Hr) IV Continuous <Continuous>    MEDICATIONS  (PRN):  diphenhydrAMINE 25 milliGRAM(s) Oral every 6 hours PRN Pruritus  lanolin Ointment 1 Application(s) Topical every 6 hours PRN Sore Nipples  magnesium hydroxide Suspension 30 milliLiter(s) Oral two times a day PRN Constipation  oxyCODONE    IR 5 milliGRAM(s) Oral every 3 hours PRN Moderate to Severe Pain (4-10)  oxyCODONE    IR 5 milliGRAM(s) Oral once PRN Moderate to Severe Pain (4-10)  simethicone 80 milliGRAM(s) Chew every 4 hours PRN Gas    Allergies    No Known Allergies    Intolerances        Impression:POD # 2 C/S STABLE    Plan:D/C HOME. INSTR GIVEN. POSTOP 2 WEEKS    TEJINDER KHOURY
Day 1 of Anesthesia Pain Management Service    SUBJECTIVE:  Pain Scale Score:          [X] Refer to charted pain scores    THERAPY: Received PF spinal morphine as above    OBJECTIVE:    Sedation:        	[X] Alert	[ ] Drowsy	[ ] Arousable      [ ] Asleep       [ ] Unresponsive    Side Effects:	[X] None	[ ] Nausea	[ ] Vomiting         [ ] Pruritus  		[ ] Weakness            [ ] Numbness	          [ ] Other:    ASSESSMENT/ PLAN  [X] Patient transitioned to prn analgesics  [X] Pain management per primary service, pain service to sign off   [X]Documentation and Verification of current medications
OB Progress Note:  Delivery, POD#1    S: 29yo POD#1 s/p LTCS . Her pain is well controlled. She is tolerating a regular diet and passing flatus. Denies N/V. Denies CP/SOB/lightheadedness/dizziness.   She is ambulating without difficulty.   Voiding spontaneously.     O:   Vital Signs Last 24 Hrs  T(C): 36.5 (10 Mar 2021 06:00), Max: 37.4 (09 Mar 2021 16:20)  T(F): 97.7 (10 Mar 2021 06:00), Max: 99.3 (09 Mar 2021 16:20)  HR: 64 (10 Mar 2021 06:00) (60 - 103)  BP: 125/68 (10 Mar 2021 06:00) (94/59 - 129/69)  BP(mean): 84 (09 Mar 2021 15:55) (71 - 90)  RR: 18 (10 Mar 2021 06:00) (14 - 44)  SpO2: 97% (10 Mar 2021 06:00) (97% - 100%)    Labs:  Blood type: O Positive  Rubella IgG: RPR: Negative    PE:  General: NAD  Abdomen: Mildly distended, appropriately tender, incision c/d/i.  Extremities: No erythema, no pitting edema
Patient seen and examined at bedside, no acute overnight events. No acute concerns, pain well controlled. Patient is ambulating, voiding spontaneously, passing flatus, and tolerating regular diet. Denies CP, SOB, N/V, HA, blurred vision, epigastric pain.    Vital Signs Last 24 Hours  T(C): 36.4 (03-11-21 @ 05:57), Max: 36.7 (03-10-21 @ 12:34)  HR: 67 (03-11-21 @ 05:57) (67 - 93)  BP: 95/56 (03-11-21 @ 05:57) (95/56 - 103/68)  RR: 18 (03-11-21 @ 05:57) (18 - 18)  SpO2: 98% (03-11-21 @ 05:57) (96% - 98%)    Physical Exam:  General: NAD  Abdomen: Soft, non-tender, non-distended, fundus firm  Incision: Pfannensteil incision CDI, subcuticular suture closure  Pelvic: Lochia wnl    Labs:    Blood Type: O Positive  Antibody Screen: Negative  RPR: Negative               11.4   14.61 )-----------( 217      ( 03-10 @ 07:10 )             34.1                12.2   11.98 )-----------( 201      ( 02-24 @ 12:45 )             37.4         MEDICATIONS  (STANDING):  acetaminophen   Tablet .. 975 milliGRAM(s) Oral <User Schedule>  diphtheria/tetanus/pertussis (acellular) Vaccine (ADAcel) 0.5 milliLiter(s) IntraMuscular once  heparin   Injectable 5000 Unit(s) SubCutaneous every 12 hours  ibuprofen  Tablet. 600 milliGRAM(s) Oral every 6 hours  ketorolac   Injectable 30 milliGRAM(s) IV Push every 6 hours  lactated ringers. 1000 milliLiter(s) (125 mL/Hr) IV Continuous <Continuous>  levothyroxine 50 MICROGram(s) Oral daily  oxytocin Infusion 333.333 milliUNIT(s)/Min (1000 mL/Hr) IV Continuous <Continuous>  oxytocin Infusion 333.333 milliUNIT(s)/Min (1000 mL/Hr) IV Continuous <Continuous>    MEDICATIONS  (PRN):  diphenhydrAMINE 25 milliGRAM(s) Oral every 6 hours PRN Pruritus  lanolin Ointment 1 Application(s) Topical every 6 hours PRN Sore Nipples  magnesium hydroxide Suspension 30 milliLiter(s) Oral two times a day PRN Constipation  oxyCODONE    IR 5 milliGRAM(s) Oral every 3 hours PRN Moderate to Severe Pain (4-10)  oxyCODONE    IR 5 milliGRAM(s) Oral once PRN Moderate to Severe Pain (4-10)  simethicone 80 milliGRAM(s) Chew every 4 hours PRN Gas

## 2021-03-11 NOTE — PROGRESS NOTE ADULT - PROBLEM SELECTOR PLAN 1
- Continue regular diet.  - Increase ambulation.  - Continue motrin, tylenol, oxycodone PRN for pain control.  - F/u AM CBC    Pascale Murdock MD PGY1
- Continue with po analgesia  - Increase ambulation  - Continue regular diet    Usha Rojas, PGY-1

## 2021-03-11 NOTE — PROGRESS NOTE ADULT - ASSESSMENT
A/P: 29yo POD#3 s/p LTCS.  Patient is stable and doing well post-operatively.  
A/P: 31yo POD#1 s/p LTCS.  Patient is stable and doing well post-operatively.

## 2021-03-11 NOTE — DISCHARGE NOTE OB - MEDICATION SUMMARY - MEDICATIONS TO STOP TAKING
I will STOP taking the medications listed below when I get home from the hospital:    levothyroxine 50 mcg (0.05 mg) oral capsule  -- 1 cap(s) by mouth once a day

## 2021-04-19 ENCOUNTER — RESULT REVIEW (OUTPATIENT)
Age: 31
End: 2021-04-19

## 2021-07-26 NOTE — OB RN PATIENT PROFILE - BLOOD TYPED: DATE, OB PROFILE
Per Dr. Titus, Ms. Whittaker has been called with 's lab results and recommendations. Follow-up as planned or  sooner if any problems.        07-Aug-2020

## 2022-01-11 PROBLEM — E03.9 HYPOTHYROIDISM, UNSPECIFIED: Chronic | Status: ACTIVE | Noted: 2021-02-24

## 2022-02-02 ENCOUNTER — APPOINTMENT (OUTPATIENT)
Dept: INTERNAL MEDICINE | Facility: CLINIC | Age: 32
End: 2022-02-02

## 2022-02-04 ENCOUNTER — APPOINTMENT (OUTPATIENT)
Dept: INTERNAL MEDICINE | Facility: CLINIC | Age: 32
End: 2022-02-04
Payer: COMMERCIAL

## 2022-02-04 VITALS
HEART RATE: 60 BPM | WEIGHT: 119 LBS | BODY MASS INDEX: 21.09 KG/M2 | OXYGEN SATURATION: 99 % | TEMPERATURE: 98 F | DIASTOLIC BLOOD PRESSURE: 74 MMHG | HEIGHT: 63 IN | SYSTOLIC BLOOD PRESSURE: 111 MMHG

## 2022-02-04 DIAGNOSIS — Z82.49 FAMILY HISTORY OF ISCHEMIC HEART DISEASE AND OTHER DISEASES OF THE CIRCULATORY SYSTEM: ICD-10-CM

## 2022-02-04 DIAGNOSIS — Z11.59 ENCOUNTER FOR SCREENING FOR OTHER VIRAL DISEASES: ICD-10-CM

## 2022-02-04 DIAGNOSIS — Z78.9 OTHER SPECIFIED HEALTH STATUS: ICD-10-CM

## 2022-02-04 DIAGNOSIS — Z01.818 ENCOUNTER FOR OTHER PREPROCEDURAL EXAMINATION: ICD-10-CM

## 2022-02-04 DIAGNOSIS — Z87.42 PERSONAL HISTORY OF OTHER DISEASES OF THE FEMALE GENITAL TRACT: ICD-10-CM

## 2022-02-04 DIAGNOSIS — Z00.00 ENCOUNTER FOR GENERAL ADULT MEDICAL EXAMINATION W/OUT ABNORMAL FINDINGS: ICD-10-CM

## 2022-02-04 PROCEDURE — 36415 COLL VENOUS BLD VENIPUNCTURE: CPT

## 2022-02-04 PROCEDURE — 99385 PREV VISIT NEW AGE 18-39: CPT | Mod: 25

## 2022-02-04 RX ORDER — LEVOTHYROXINE SODIUM 0.05 MG/1
50 TABLET ORAL
Qty: 30 | Refills: 6 | Status: DISCONTINUED | COMMUNITY
Start: 2018-06-06 | End: 2022-02-04

## 2022-02-04 RX ORDER — DOXYCYCLINE HYCLATE 100 MG/1
100 TABLET ORAL
Qty: 8 | Refills: 0 | Status: DISCONTINUED | COMMUNITY
Start: 2018-06-04 | End: 2022-02-04

## 2022-02-04 NOTE — HEALTH RISK ASSESSMENT
[Patient reported PAP Smear was normal] : Patient reported PAP Smear was normal [Good] : ~his/her~  mood as  good [Never] : Never [No] : No [0] : 2) Feeling down, depressed, or hopeless: Not at all (0) [PHQ-2 Negative - No further assessment needed] : PHQ-2 Negative - No further assessment needed [Audit-CScore] : 0 [[id number]] : 0 [None] : None [With Family] : lives with family [Employed] : employed [] :  [# Of Children ___] : has [unfilled] children [PapSmearDate] : 01/21 [FreeTextEntry2] : [place]

## 2022-02-04 NOTE — HISTORY OF PRESENT ILLNESS
[de-identified] : 31 year F with PMH hypothyroidism in pregnancy presents for CPE. Pt denies CP/SOB, fever/chills, n/v/d/c.

## 2022-02-04 NOTE — PLAN
[FreeTextEntry1] : Routine blood work and urine today. COVID-19 abs requested. Pt advised to sign up for [facility] portal to review labs and communicate any questions or concerns directly. Yearly physical and return as needed for illness, medication refills, and new or existing complaints.

## 2022-02-08 ENCOUNTER — TRANSCRIPTION ENCOUNTER (OUTPATIENT)
Age: 32
End: 2022-02-08

## 2022-02-08 LAB
ALBUMIN SERPL ELPH-MCNC: 5.1 G/DL
ALP BLD-CCNC: 64 U/L
ALT SERPL-CCNC: 15 U/L
ANION GAP SERPL CALC-SCNC: 12 MMOL/L
APPEARANCE: ABNORMAL
AST SERPL-CCNC: 16 U/L
BACTERIA: NEGATIVE
BASOPHILS # BLD AUTO: 0.06 K/UL
BASOPHILS NFR BLD AUTO: 1.4 %
BILIRUB SERPL-MCNC: 0.4 MG/DL
BILIRUBIN URINE: NEGATIVE
BLOOD URINE: NEGATIVE
BUN SERPL-MCNC: 13 MG/DL
CALCIUM SERPL-MCNC: 9.7 MG/DL
CHLORIDE SERPL-SCNC: 105 MMOL/L
CHOLEST SERPL-MCNC: 212 MG/DL
CO2 SERPL-SCNC: 25 MMOL/L
COLOR: NORMAL
COVID-19 SPIKE DOMAIN ANTIBODY INTERPRETATION: POSITIVE
CREAT SERPL-MCNC: 0.76 MG/DL
EOSINOPHIL # BLD AUTO: 0.12 K/UL
EOSINOPHIL NFR BLD AUTO: 2.8 %
ESTIMATED AVERAGE GLUCOSE: 103 MG/DL
GLUCOSE QUALITATIVE U: NEGATIVE
GLUCOSE SERPL-MCNC: 92 MG/DL
HBA1C MFR BLD HPLC: 5.2 %
HCT VFR BLD CALC: 43.9 %
HDLC SERPL-MCNC: 91 MG/DL
HGB BLD-MCNC: 14 G/DL
HYALINE CASTS: 0 /LPF
IMM GRANULOCYTES NFR BLD AUTO: 0.2 %
KETONES URINE: NEGATIVE
LDLC SERPL CALC-MCNC: 110 MG/DL
LEUKOCYTE ESTERASE URINE: NEGATIVE
LYMPHOCYTES # BLD AUTO: 1.38 K/UL
LYMPHOCYTES NFR BLD AUTO: 32.1 %
MAN DIFF?: NORMAL
MCHC RBC-ENTMCNC: 30 PG
MCHC RBC-ENTMCNC: 31.9 GM/DL
MCV RBC AUTO: 94 FL
MICROSCOPIC-UA: NORMAL
MONOCYTES # BLD AUTO: 0.26 K/UL
MONOCYTES NFR BLD AUTO: 6 %
NEUTROPHILS # BLD AUTO: 2.47 K/UL
NEUTROPHILS NFR BLD AUTO: 57.5 %
NITRITE URINE: NEGATIVE
NONHDLC SERPL-MCNC: 121 MG/DL
PH URINE: 7.5
PLATELET # BLD AUTO: 200 K/UL
POTASSIUM SERPL-SCNC: 4.7 MMOL/L
PROT SERPL-MCNC: 7.5 G/DL
PROTEIN URINE: NEGATIVE
RBC # BLD: 4.67 M/UL
RBC # FLD: 13.2 %
RED BLOOD CELLS URINE: 2 /HPF
SARS-COV-2 AB SERPL IA-ACNC: >250 U/ML
SODIUM SERPL-SCNC: 141 MMOL/L
SPECIFIC GRAVITY URINE: 1.01
SQUAMOUS EPITHELIAL CELLS: 26 /HPF
TRIGL SERPL-MCNC: 56 MG/DL
TSH SERPL-ACNC: 1.71 UIU/ML
UROBILINOGEN URINE: NORMAL
WBC # FLD AUTO: 4.3 K/UL
WHITE BLOOD CELLS URINE: 1 /HPF

## 2022-04-11 PROBLEM — Z11.59 SCREENING FOR VIRAL DISEASE: Status: ACTIVE | Noted: 2022-02-04

## 2023-04-27 NOTE — PRE-ANESTHESIA EVALUATION ADULT - WEIGHT IN LBS
If you are a smoker, it is important for your health to stop smoking. Please be aware that second hand smoke is also harmful. 166.8

## 2023-05-05 ENCOUNTER — OFFICE (OUTPATIENT)
Dept: URBAN - METROPOLITAN AREA CLINIC 35 | Facility: CLINIC | Age: 33
Setting detail: OPHTHALMOLOGY
End: 2023-05-05
Payer: COMMERCIAL

## 2023-05-05 DIAGNOSIS — H02.834: ICD-10-CM

## 2023-05-05 DIAGNOSIS — H16.423: ICD-10-CM

## 2023-05-05 DIAGNOSIS — H01.001: ICD-10-CM

## 2023-05-05 DIAGNOSIS — H02.831: ICD-10-CM

## 2023-05-05 DIAGNOSIS — H52.13: ICD-10-CM

## 2023-05-05 DIAGNOSIS — H01.004: ICD-10-CM

## 2023-05-05 PROCEDURE — 99214 OFFICE O/P EST MOD 30 MIN: CPT | Performed by: OPHTHALMOLOGY

## 2023-05-05 ASSESSMENT — REFRACTION_MANIFEST
OD_CYLINDER: -0.25
OD_SPHERE: -1.00
OS_CYLINDER: SPHERE
OS_CYLINDER: SPHERE
OS_VA1: 20/20
OD_AXIS: 050
OS_VA1: 20/20
OS_VA1: 20/20-
OD_VA1: 20/20
OD_VA1: 20/20
OS_SPHERE: -1.75
OS_AXIS: 000
OS_AXIS: 090
OS_SPHERE: -1.50
OD_SPHERE: -1.75
OS_CYLINDER: SPHERE
OS_VA1: 20/20
OD_VA1: 20/20
OD_AXIS: 085
OS_CYLINDER: -0.25
OS_CYLINDER: 0.00
OS_SPHERE: -1.75
OD_AXIS: 160
OD_CYLINDER: -0.25
OD_SPHERE: -1.50
OD_SPHERE: -2.00
OS_CYLINDER: SPHERE
OD_VA1: 20/20
OS_SPHERE: -1.75
OD_CYLINDER: SPHERE
OD_VA1: 20/20
OD_CYLINDER: SPHERE
OS_AXIS: 000
OD_SPHERE: -1.75
OD_VA1: 20/20
OD_CYLINDER: +0.25
OS_SPHERE: -1.75
OD_SPHERE: -1.75
OS_VA1: 20/20
OD_AXIS: 055
OS_SPHERE: -2.00
OS_SPHERE: -1.50
OS_SPHERE: -1.50
OS_VA1: 20/20
OD_CYLINDER: SPHERE
OU_VA: 20/20
OS_CYLINDER: SPHERE
OD_SPHERE: -0.75
OD_SPHERE: -1.25
OD_CYLINDER: -0.25

## 2023-05-05 ASSESSMENT — REFRACTION_CURRENTRX
OS_CYLINDER: SPHERE
OD_CYLINDER: SPHERE
OD_SPHERE: -1.75
OS_SPHERE: -2.00
OS_OVR_VA: 20/
OD_OVR_VA: 20/
OD_OVR_VA: 20/
OS_OVR_VA: 20/
OS_VPRISM_DIRECTION: SV
OD_VPRISM_DIRECTION: SV

## 2023-05-05 ASSESSMENT — KERATOMETRY
OS_K2POWER_DIOPTERS: 44.00
OS_AXISANGLE_DEGREES: 091
OD_K1POWER_DIOPTERS: 43.50
OD_K2POWER_DIOPTERS: 44.00
OD_AXISANGLE_DEGREES: 103
OS_K1POWER_DIOPTERS: 43.25
METHOD_AUTO_MANUAL: AUTO

## 2023-05-05 ASSESSMENT — SPHEQUIV_DERIVED
OD_SPHEQUIV: -1.125
OS_SPHEQUIV: -1.875
OS_SPHEQUIV: -1.5
OD_SPHEQUIV: -0.875
OD_SPHEQUIV: -1.125
OD_SPHEQUIV: -1.875
OD_SPHEQUIV: -1.625
OS_SPHEQUIV: -1.375

## 2023-05-05 ASSESSMENT — LID EXAM ASSESSMENTS
OS_BLEPHARITIS: T
OD_BLEPHARITIS: T

## 2023-05-05 ASSESSMENT — REFRACTION_AUTOREFRACTION
OD_CYLINDER: -0.25
OS_AXIS: 110
OD_SPHERE: -1.00
OS_SPHERE: -1.25
OD_AXIS: 054
OS_CYLINDER: -0.25

## 2023-05-05 ASSESSMENT — VISUAL ACUITY
OD_BCVA: 20/20
OS_BCVA: 20/20

## 2023-05-05 ASSESSMENT — AXIALLENGTH_DERIVED
OD_AL: 23.9435
OD_AL: 23.8436
OS_AL: 24.297
OS_AL: 24.143
OS_AL: 24.0921
OD_AL: 23.9435
OD_AL: 24.2484
OD_AL: 24.1459

## 2023-05-05 ASSESSMENT — VASCULARIZATION
OS_VASCULARIZATION: PANNUS
OD_VASCULARIZATION: PANNUS

## 2023-05-05 ASSESSMENT — CONFRONTATIONAL VISUAL FIELD TEST (CVF)
OS_FINDINGS: FULL
OD_FINDINGS: FULL

## 2023-05-05 ASSESSMENT — LID POSITION - DERMATOCHALASIS
OS_DERMATOCHALASIS: LUL 1+
OD_DERMATOCHALASIS: RUL 1+

## 2023-05-05 ASSESSMENT — TONOMETRY
OD_IOP_MMHG: 18
OS_IOP_MMHG: 18

## 2023-09-03 NOTE — DISCHARGE NOTE OB - MATERIALS PROVIDED
No [facility] [place] Screening Program/Breastfeeding Log/Breastfeeding Mother’s Support Group Information/Guide to Postpartum Care/Back To Sleep Handout/Shaken Baby Prevention Handout/Breastfeeding Guide and Packet/Birth Certificate Instructions/Discharge Medication Information for Patients and Families Pocket Guide

## 2023-09-21 NOTE — PRE-ANESTHESIA EVALUATION ADULT - NSANTHAPLANRD_GEN_ALL_CORE
regional Non-Graft Cartilage Fenestration Text: The cartilage was fenestrated with a 2mm punch biopsy to help facilitate healing.

## 2024-07-12 NOTE — OB RN PATIENT PROFILE - LIVING CHILDREN, OB PROFILE
Problem: Knowledge Deficit - Standard  Goal: Patient and family/care givers will demonstrate understanding of plan of care, disease process/condition, diagnostic tests and medications  Description: Target End Date:  1-3 days or as soon as patient condition allows    Document in Patient Education    1.  Patient and family/caregiver oriented to unit, equipment, visitation policy and means for communicating concern  2.  Complete/review Learning Assessment  3.  Assess knowledge level of disease process/condition, treatment plan, diagnostic tests and medications  4.  Explain disease process/condition, treatment plan, diagnostic tests and medications  Outcome: Progressing     Problem: Skin Integrity  Goal: Skin integrity is maintained or improved  Description: Target End Date:  Prior to discharge or change in level of care    Document interventions on Skin Risk/Drew flowsheet groups and corresponding LDA    1.  Assess and monitor skin integrity, appearance and/or temperature  2.  Assess risk factors for impaired skin integrity and/or pressures ulcers  3.  Implement precautions to protect skin integrity in collaboration with interdisciplinary team  4.  Implement pressure ulcer prevention protocol if at risk for skin breakdown  5.  Confirm wound care consult if at risk for skin breakdown  6.  Ensure patient use of pressure relieving devices  (Low air loss bed, waffle overlay, heel protectors, ROHO cushion, etc)  Outcome: Progressing     Problem: Fall Risk  Goal: Patient will remain free from falls  Description: Target End Date:  Prior to discharge or change in level of care    Document interventions on the Eda Hernandez Fall Risk Assessment    1.  Assess for fall risk factors  2.  Implement fall precautions  Outcome: Progressing     The patient is Stable - Low risk of patient condition declining or worsening    Shift Goals  Clinical Goals: Monitor neuro status/ maintained skin integrity  Patient Goals: sleep  Family  Goals: joseluis    Progress made toward(s) clinical / shift goals:  Patient is alert , oriented to self. Positioned comfortably in bed . Wound culture taken , scheduled for right foot x-ray and wound consult in am . Passed swallow test ,recommended for diet . Hourly rounds to ensure safety and comfort .     Patient is not progressing towards the following goals:       1

## 2025-01-17 ENCOUNTER — NON-APPOINTMENT (OUTPATIENT)
Age: 35
End: 2025-01-17

## 2025-09-03 ENCOUNTER — APPOINTMENT (OUTPATIENT)
Dept: ORTHOPEDIC SURGERY | Facility: CLINIC | Age: 35
End: 2025-09-03
Payer: COMMERCIAL

## 2025-09-03 ENCOUNTER — RESULT CHARGE (OUTPATIENT)
Age: 35
End: 2025-09-03

## 2025-09-03 VITALS — HEIGHT: 63 IN | WEIGHT: 120 LBS | BODY MASS INDEX: 21.26 KG/M2

## 2025-09-03 DIAGNOSIS — M70.62 TROCHANTERIC BURSITIS, LEFT HIP: ICD-10-CM

## 2025-09-03 DIAGNOSIS — M76.32 ILIOTIBIAL BAND SYNDROME, LEFT LEG: ICD-10-CM

## 2025-09-03 PROCEDURE — 73502 X-RAY EXAM HIP UNI 2-3 VIEWS: CPT

## 2025-09-03 PROCEDURE — 73552 X-RAY EXAM OF FEMUR 2/>: CPT | Mod: LT

## 2025-09-03 PROCEDURE — 99203 OFFICE O/P NEW LOW 30 MIN: CPT | Mod: 25

## 2025-09-03 RX ORDER — MELOXICAM 15 MG/1
15 TABLET ORAL
Qty: 30 | Refills: 1 | Status: ACTIVE | COMMUNITY
Start: 2025-09-03 | End: 1900-01-01

## 2025-09-08 ENCOUNTER — APPOINTMENT (OUTPATIENT)
Dept: MRI IMAGING | Facility: CLINIC | Age: 35
End: 2025-09-08

## 2025-09-17 ENCOUNTER — APPOINTMENT (OUTPATIENT)
Dept: ORTHOPEDIC SURGERY | Facility: CLINIC | Age: 35
End: 2025-09-17